# Patient Record
Sex: FEMALE | Race: WHITE | NOT HISPANIC OR LATINO | Employment: FULL TIME | URBAN - METROPOLITAN AREA
[De-identification: names, ages, dates, MRNs, and addresses within clinical notes are randomized per-mention and may not be internally consistent; named-entity substitution may affect disease eponyms.]

---

## 2017-07-07 ENCOUNTER — ALLSCRIPTS OFFICE VISIT (OUTPATIENT)
Dept: OTHER | Facility: OTHER | Age: 27
End: 2017-07-07

## 2017-07-07 ENCOUNTER — GENERIC CONVERSION - ENCOUNTER (OUTPATIENT)
Dept: OTHER | Facility: OTHER | Age: 27
End: 2017-07-07

## 2017-07-11 ENCOUNTER — GENERIC CONVERSION - ENCOUNTER (OUTPATIENT)
Dept: OTHER | Facility: OTHER | Age: 27
End: 2017-07-11

## 2017-07-11 LAB
ADEQUACY: (HISTORICAL): NORMAL
CHLAMYDIA TRACHOMATIS BY MOL. METHOD (HISTORICAL): NEGATIVE
CLINICIAN PROVIDIED ICD 9 OR 10 (HISTORICAL): NORMAL
COMMENT (HISTORICAL): NORMAL
DIAGNOSIS (HISTORICAL): NORMAL
N GONORRHOEAE, AMPLIFIED DNA (HISTORICAL): NEGATIVE
NOTE: (HISTORICAL): NORMAL
PERFORMED BY (HISTORICAL): NORMAL
REFLEX (HISTORICAL): NORMAL
TEST INFORMATION (HISTORICAL): NORMAL

## 2018-01-10 NOTE — PROGRESS NOTES
Assessment    1  Oral contraceptive prescribed (V25 01) (Z30 011)   2  Never a smoker   3  Exposure to potentially hazardous body fluids (V15 85) (Z77 21)   4  Cholelithiasis (574 20) (K80 20)   5  Abdominal pain, RUQ (789 01) (R10 11)    Plan  Abdominal pain, RUQ, Cholelithiasis    · * [place] HEPATOBILIARY; Status:Need Information - Financial Authorization; Requested  for:83Lse7478;   Exposure to potentially hazardous body fluids    · (LC) Chlamydia/GC Amplification; Status:Active; Requested for:94Clb7289;   Oral contraceptive prescribed    · Norgestim-Eth Estrad Triphasic 0 18/0 215/0 25 MG-35 MCG Oral Tablet (Ortho  Tri-Cyclen (28)); take one tablet by mouth every day    Discussion/Summary    Cholelithiasis - reviewed ER results and current diet restriction, hepatobiliary scan  oral contraceptives - working well  Chief Complaint  f/u medication review rmklpn      History of Present Illness  She is working full time and doing well  her birth control is working well  She had a gall bladder attack this summer  She gets pain if she eats dairy or anything fatty  Active Problems    1  Anal fissure (565 0) (K60 2)   2  Anemia (285 9) (D64 9)   3  Cholelithiasis (574 20) (K80 20)   4  Chondromalacia of patella, 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)   5  Encounter for routine pelvic examination (V72 31) (Z01 419)   6  Hidradenitis suppurativa (705 83) (L73 2)   7  Long term use of drug (V58 69) (Z79 899)   8  Oral contraceptive prescribed (V25 01) (Z30 011)   9  Right knee pain (719 46) (M25 561)   10  Well adult on routine health check (V70 0) (Z00 00)    Past Medical History    1  History of Acute upper respiratory infection (465 9) (J06 9)   2  History of Acute upper respiratory infection (465 9) (J06 9)   3  History of Hip pain, unspecified laterality   4  History of abdominal pain (V13 89) (Z87 898)   5  History of acute conjunctivitis (V12 49) (Z86 69)   6   History of infectious mononucleosis (V12 09) (Z86 19)   7  History of low back pain (V13 59) (Z87 39)   8  History of Late Effects Of Sprain Or Strain (905 7)   9  Oral contraceptive prescribed (V25 01) (Z30 011)   10  History of Otitis media, unspecified laterality    Social History    · Never a smoker  The social history was reviewed and updated today  Current Meds   1  Norgestim-Eth Estrad Triphasic 0 18/0 215/0 25 MG-35 MCG Oral Tablet; take one tablet   by mouth every day; Therapy: 02XIF7779 to (Last Rx:20Jan2016)  Requested for: 20Jan2016 Ordered    Allergies    1  No Known Drug Allergies    Vitals  Vital Signs [Data Includes: Current Encounter]    Recorded: 25Rgd9148 06:48PM   Temperature 98 2 F   Heart Rate 76   Respiration 18   Systolic 863   Diastolic 60   Height 5 ft 5 in   Weight 127 lb    BMI Calculated 21 13   BSA Calculated 1 63     Physical Exam    Constitutional   General appearance: No acute distress, well appearing and well nourished  Ears, Nose, Mouth, and Throat   External inspection of ears and nose: Normal     Otoscopic examination: Tympanic membranes translucent with normal light reflex  Canals patent without erythema  Oropharynx: Normal with no erythema, edema, exudate or lesions  Pulmonary   Auscultation of lungs: Clear to auscultation  Cardiovascular   Auscultation of heart: Normal rate and rhythm, normal S1 and S2, without murmurs  Musculoskeletal   Gait and station: Normal          Health Management  Encounter for routine pelvic examination   (every) THINPREP PAP RFX HR HPV DNA AND C  TRACHOMATIS AND N  GONORRHOEAE; every 1  year; Last 47HTP2699; Next Due: 82RVC6113;  Overdue    Signatures   Electronically signed by : Anila Beal DO; Feb  3 2016  7:14PM EST                       (Author)

## 2018-01-10 NOTE — PROGRESS NOTES
Assessment    1  Encounter for preventive health examination (V70 0) (Z00 00)   2  Encounter for routine pelvic examination (V72 31) (Z01 419)   3  Never a smoker   4  Skin lesion (709 9) (L98 9)    Plan  Encounter for routine pelvic examination    · (1) CHLAMYDIA/GC AMPLIFIED DNA, PCR; Source:Endocervical; Status: In Progress -  Specimen/Data Collected;   Done: 57SPF4956   · (LC) Pap IG, rfx HPV ASCU; Status: In Progress - Specimen/Data Collected;   Done:  35JEE3810  Oral contraceptive prescribed    · Norgestim-Eth Estrad Triphasic 0 18/0 215/0 25 MG-35 MCG Oral Tablet (Ortho  Tri-Cyclen (28)); take one tablet by mouth every day  Skin lesion    · 2 - Lenin GIVENS, Fide Becerra  (Dermatology) Physician Referral  Consult  Status: Hold For -  Scheduling  Requested for: 82HVP1361  Care Summary provided  : Yes    Discussion/Summary  health maintenance visit healthy adult female Currently, she eats a healthy diet and has an adequate exercise regimen  Pap test with reflex HPV testing was done today Testing was done today for chlamydia and gonorrhea  Breast cancer screening: breast cancer screening is not indicated  Chief Complaint  CPE rmklpn      History of Present Illness  HM, Adult Female: The patient is being seen for a health maintenance and gynecology evaluation  General Health: The patient's health since the last visit is described as good  Immunizations status: up to date  Lifestyle:  She consumes a diverse and healthy diet  She does not have any weight concerns  She exercises regularly  She does not use tobacco    Reproductive health: the patient is premenopausal    Screening:      Review of Systems    Constitutional: No fever, no chills, feels well, no tiredness, no recent weight gain or weight loss  ENT: no complaints of earache, no loss of hearing, no nose bleeds, no nasal discharge, no sore throat, no hoarseness     Cardiovascular: No complaints of slow heart rate, no fast heart rate, no chest pain, no palpitations, no leg claudication, no lower extremity edema  Respiratory: No complaints of shortness of breath, no wheezing, no cough, no SOB on exertion, no orthopnea, no PND  Neurological: No complaints of headache, no confusion, no convulsions, no numbness, no dizziness or fainting, no tingling, no limb weakness, no difficulty walking  Endocrine: No complaints of proptosis, no hot flashes, no muscle weakness, no deepening of the voice, no feelings of weakness  Active Problems    1  Anemia (285 9) (D64 9)   2  Cholelithiasis (574 20) (K80 20)   3  Chondromalacia of patella, 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)   4  Encounter for routine pelvic examination (V72 31) (Z01 419)   5  Exposure to potentially hazardous body fluids (V15 85) (Z77 21)   6  Hidradenitis suppurativa (705 83) (L73 2)   7  Long term use of drug (V58 69) (Z79 899)   8  Oral contraceptive prescribed (V25 01) (Z30 011)   9  Well adult on routine health check (V70 0) (Z00 00)    Past Medical History    · Abdominal pain, RUQ (789 01) (R10 11)   · History of Acute upper respiratory infection (465 9) (J06 9)   · History of Acute upper respiratory infection (465 9) (J06 9)   · History of Anal fissure (565 0) (K60 2)   · History of Hip pain, unspecified laterality   · History of abdominal pain (V13 89) (O60 442)   · History of acute conjunctivitis (V12 49) (Z86 69)   · History of infectious mononucleosis (V12 09) (Z86 19)   · History of low back pain (V13 59) (Z87 39)   · History of Late Effects Of Sprain Or Strain (905 7)   · Oral contraceptive prescribed (V25 01) (Z30 011)   · History of Otitis media, unspecified laterality    Social History    · Never a smoker    Current Meds   1  Norgestim-Eth Estrad Triphasic 0 18/0 215/0 25 MG-35 MCG Oral Tablet; take one tablet   by mouth every day; Therapy: 28JFI4432 to (Evaluate:24Gvm4043)  Requested for: 19Apr2016; Last   Rx:52Clh8540 Ordered    Allergies    1   No Known Drug Allergies    Vitals   Recorded: 56ODB4083 04:35PM   Temperature 98 2 F   Heart Rate 82   Respiration 18   Systolic 106   Diastolic 66   Height 5 ft 5 in   Weight 124 lb    BMI Calculated 20 63   BSA Calculated 1 62     Physical Exam    Constitutional   General appearance: No acute distress, well appearing and well nourished  Ears, Nose, Mouth, and Throat   External inspection of ears and nose: Normal     Otoscopic examination: Tympanic membranes translucent with normal light reflex  Canals patent without erythema  Oropharynx: Normal with no erythema, edema, exudate or lesions  Pulmonary   Auscultation of lungs: Clear to auscultation  Cardiovascular   Auscultation of heart: Normal rate and rhythm, normal S1 and S2, no murmurs  Chest   Breasts: Normal, no dimpling or skin changes appreciated  Palpation of breasts and axillae: Normal, no masses palpated  Abdomen   Abdomen: Non-tender, no masses  Liver and spleen: No hepatomegaly or splenomegaly  Genitourinary   External genitalia and vagina: Normal, no lesions appreciated  Urethra: Normal, no discharge  Bladder: Not distended, no tenderness  Cervix: Normal, no lesions, Pap obtained  Uterus: Normal size, no tenderness, no masses  Adnexa/Parametria: Normal, no masses or tenderness  Lymphatic   Palpation of lymph nodes in neck: No lymphadenopathy  Musculoskeletal   Gait and station: Normal     Neurologic   Cortical function: Normal mental status  Psychiatric   Mood and affect: Normal        Procedure    Procedure: Visual Acuity Test    Indication: routine screening  Inforrmation supplied by a Snellen chart  Results: 20/30 in both eyes with corrective device, 20/30 in the right eye with corrective device, 20/50 in the left eye with corrective device      Health Management  Encounter for routine pelvic examination   (every) THINPREP PAP RFX HR HPV DNA AND C  TRACHOMATIS AND N   GONORRHOEAE; every 2 years;  Last 47QKN9727; Next Due: 01SZI7228;  Active    Signatures   Electronically signed by : Maribell Griffin DO; May 20 2016  5:06PM EST                       (Author)

## 2018-01-12 NOTE — RESULT NOTES
Verified Results  * NM HEPATOBILIARY [id number] Amrit Arauz Order Number: [id number]   Performing Comments: cholelithiasis on ultrasound, no CCK   - Patient Instructions: To schedule this appointment, please contact Central Scheduling at [phone]  Test Name Result Flag Reference   NM HEPATOBILIARY (Report)     HEPATOBILIARY SCAN     INDICATION: Right upper quadrant pain     COMPARISON: None available     TECHNIQUE:  Following the intravenous administration of 5 2 mCi Tc-99m labeled Choletec, dynamic abdominal imaging was obtained in the anterior projection over a 60 minute time period  FINDINGS:      There is prompt, uniform accumulation with normal clearance of the radiopharmaceutical by the liver  There is normal filling of the intrahepatic ducts, common bile duct and gallbladder with normal excretion of the radiopharmaceutical into the duodenum  IMPRESSION:     Normal hepatobiliary study         Workstation performed: [id number]     Signed by:   Tommy Fernandez MD   3/25/16       Discussion/Summary   Howard Leggett,   Your gall bladder scan is normal    Dr Alissa Rausch

## 2018-01-12 NOTE — RESULT NOTES
Discussion/Summary   Yumiko Alvarez,   Your chlamydia and gonorrhea tests are negative     Dr Mitch Tuttle      Verified Results  [facility]) [facility] Amplification [id number] 12:00AM Blanca Clark     Test Name Result Flag Reference   Chlamydia trachomatis, ZOEY Negative  Negative   Neisseria gonorrhoeae, ZOEY Negative  Negative

## 2018-01-15 NOTE — RESULT NOTES
Discussion/Summary   Geovanni Neely,   Your pap smear is normal    Dr Makenzie Frank      Verified Results  [facility]) Pap IG, rfx HPV ASCU [id number] 12:00AM Bhanu, [address]    Agnieszka Lui Endocervix  No  of containers  Agnieszka Liu 01 CYTYC Thin Prep Vial     Test Name Result Flag Reference   DIAGNOSIS: Comment     NEGATIVE FOR INTRAEPITHELIAL LESION AND MALIGNANCY  Specimen adequacy: Comment     Satisfactory for evaluation  Endocervical and/or squamous metaplastic  cells (endocervical component) are present  Clinician provided ICD10: Comment     Z01 419   Performed by: Aby Bashir Cytotechnologist (ASCP)   Comm   Note: Comment     The Pap smear is a screening test designed to aid in the detection of  premalignant and malignant conditions of the uterine cervix  It is not a  diagnostic procedure and should not be used as the sole means of detecting  cervical cancer  Both false-positive and false-negative reports do occur  Test Methodology: Comment     This liquid based ThinPrep(R) pap test was screened with the  use of an image guided system  Reflex Comment     The HPV DNA reflex criteria were not met with this specimen result  therefore, no HPV testing was performed

## 2018-01-15 NOTE — PROGRESS NOTES
Assessment    1  Encounter for preventive health examination (V70 0) (Z00 00)   2  Encounter for routine pelvic examination (V72 31) (Z01 419)   3  Screening for gonorrhea (V74 5) (Z11 3)   4  Screening for chlamydial disease (V73 98) (Z11 8)    Plan  Encounter for routine pelvic examination    · (LC) Pap IG, rfx HPV ASCU; Status: In Progress - Specimen/Data Collected;   Done:  94VSF7322  Health Maintenance    · Begin a limited exercise program ; Status:Complete;   Done: 88XLS7648 10:22AM  Oral contraceptive prescribed    · Norgestim-Eth Estrad Triphasic 0 18/0 215/0 25 MG-35 MCG Oral Tablet (Ortho  Tri-Cyclen (28)); take one tablet by mouth every day  Screening for chlamydial disease, Screening for gonorrhea    · (1) CHLAMYDIA/GC AMPLIFIED DNA, PCR; Source:Endocervical; Status: In Progress -  Specimen/Data Collected;   Done: 37TWE5160    Discussion/Summary  health maintenance visit healthy adult female Currently, she eats a healthy diet and has an inadequate exercise regimen  Pap test with reflex HPV testing was done today Testing was done today for chlamydia and gonorrhea  Breast cancer screening: breast cancer screening is not indicated  Colorectal cancer screening: colorectal cancer screening is not indicated  The immunizations are up to date  Chief Complaint  cpe      History of Present Illness  , Adult Female: The patient is being seen for a health maintenance and gynecology evaluation  General Health: The patient's health since the last visit is described as good  Lifestyle:  She consumes a diverse and healthy diet  She does not have any weight concerns  She does not exercise regularly  She does not use tobacco    Reproductive health: the patient is premenopausal    Screening:      Review of Systems    Constitutional: No fever, no chills, feels well, no tiredness, no recent weight gain or weight loss     Cardiovascular: No complaints of slow heart rate, no fast heart rate, no chest pain, no palpitations, no leg claudication, no lower extremity edema  Respiratory: No complaints of shortness of breath, no wheezing, no cough, no SOB on exertion, no orthopnea, no PND  Gastrointestinal: No complaints of abdominal pain, no constipation, no nausea or vomiting, no diarrhea, no bloody stools  Active Problems    1  Anemia (285 9) (D64 9)   2  Cholelithiasis (574 20) (K80 20)   3  Chondromalacia of patella, 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)   4  Encounter for routine pelvic examination (V72 31) (Z01 419)   5  Hidradenitis suppurativa (705 83) (L73 2)   6  Long term use of drug (V58 69) (Z79 899)   7  Oral contraceptive prescribed (V25 01) (Z30 011)   8  Well adult on routine health check (V70 0) (Z00 00)    Past Medical History    · Abdominal pain, RUQ (789 01) (R10 11)   · History of Acute upper respiratory infection (465 9) (J06 9)   · History of Acute upper respiratory infection (465 9) (J06 9)   · History of Anal fissure (565 0) (K60 2)   · Exposure to potentially hazardous body fluids (V15 85) (Z77 21)   · History of Hip pain, unspecified laterality   · History of abdominal pain (V13 89) (E03 040)   · History of acute conjunctivitis (V12 49) (Z86 69)   · History of infectious mononucleosis (V12 09) (Z86 19)   · History of low back pain (V13 59) (Z87 39)   · History of Late Effects Of Sprain Or Strain (905 7)   · Oral contraceptive prescribed (V25 01) (Z30 011)   · History of Otitis media, unspecified laterality   · History of Skin lesion (709 9) (L98 9)    Family History  Mother    · Family history of Status post cholecystectomy    Social History    · Never a smoker    Current Meds   1  Norgestim-Eth Estrad Triphasic 0 18/0 215/0 25 MG-35 MCG Oral Tablet; take one tablet   by mouth every day; Therapy: 56WYY9759 to (Evaluate:74Kfr5286)  Requested for: 83GTN6418; Last   Rx:01Jun2017 Ordered    Allergies    1   No Known Drug Allergies    Vitals   Recorded: 04RNO8540 09:57AM   Temperature 97 1 F Heart Rate 84   Respiration 16   Systolic 471   Diastolic 76   Height 5 ft 5 in   Weight 124 lb    BMI Calculated 20 63   BSA Calculated 1 61     Physical Exam    Constitutional   General appearance: No acute distress, well appearing and well nourished  Ears, Nose, Mouth, and Throat   External inspection of ears and nose: Normal     Otoscopic examination: Tympanic membranes translucent with normal light reflex  Canals patent without erythema  Oropharynx: Normal with no erythema, edema, exudate or lesions  Neck   Neck: Supple, symmetric, trachea midline, no masses  Pulmonary   Auscultation of lungs: Clear to auscultation  Cardiovascular   Auscultation of heart: Normal rate and rhythm, normal S1 and S2, no murmurs  Chest   Breasts: Normal, no dimpling or skin changes appreciated  Palpation of breasts and axillae: Normal, no masses palpated  Abdomen   Abdomen: Non-tender, no masses  Liver and spleen: No hepatomegaly or splenomegaly  Genitourinary   External genitalia and vagina: Normal, no lesions appreciated  Urethra: Normal, no discharge  Bladder: Not distended, no tenderness  Cervix: Normal, no lesions, Pap obtained  Uterus: Normal size, no tenderness, no masses  Adnexa/Parametria: Normal, no masses or tenderness  Lymphatic   Palpation of lymph nodes in neck: No lymphadenopathy  Musculoskeletal   Gait and station: Normal     Neurologic   Cortical function: Normal mental status  Psychiatric   Mood and affect: Normal        Procedure    Procedure: Indication: routine screening     Results: 20/20 in both eyes with corrective device, 20/20 in the right eye with corrective device, 20/20 in the left eye with corrective device   Color vision was and the results were normal       Signatures   Electronically signed by : Kaya Trevizo DO; Jul 7 2017 10:22AM EST                       (Author)

## 2018-01-15 NOTE — RESULT NOTES
Verified Results  ([address] [facility]) Pap IG, rfx HPV ASCU [id number] 12:00AM BhanuBc Core   No  of containers  Yaakov Dobbins 01 CYTYC Thin Prep Vial     Test Name Result Flag Reference   DIAGNOSIS: Comment     NEGATIVE FOR INTRAEPITHELIAL LESION AND MALIGNANCY  THE CYTOLOGY PROCESSING WAS PERFORMED AT THE [facility] LOCATED AT  [facility] 12, [address], [address] [place] [id number]  Specimen adequacy: Comment     Satisfactory for evaluation  Endocervical and/or squamous metaplastic  cells (endocervical component) are present  Clinician provided ICD10: Comment     Z01 419   Performed by: Jose G Peralta, Cytotechnologist (ASCP)     Yaakov Dobbins Note: Comment     The Pap smear is a screening test designed to aid in the detection of  premalignant and malignant conditions of the uterine cervix  It is not a  diagnostic procedure and should not be used as the sole means of detecting  cervical cancer  Both false-positive and false-negative reports do occur  Test Methodology: Comment     This liquid based ThinPrep(R) pap test was screened with the  use of an image guided system    Comment     The HPV DNA reflex criteria were not met with this specimen result  therefore, no HPV testing was performed  [facility]) [facility] Amplification [id number] 12:00AM Elbridge Reas     Test Name Result Flag Reference   Chlamydia trachomatis, ZOEY Negative  Negative   Neisseria gonorrhoeae, ZOEY Negative  Negative   Please note: Comment     Acceptable specimens for this test are male urethral swab,  endocervical swab and liquid based pap specimens, vaginal swabs in  APTIMA transports and first void urine  See online Directory of  Services for test number for rectal and pharyngeal specimens  Discussion/Summary   Ru Johnson,   Your pap smear is normal   Gonorrhea and chlamydia are negative     Dr Elkin Preston

## 2018-01-20 ENCOUNTER — HOSPITAL ENCOUNTER (EMERGENCY)
Facility: HOSPITAL | Age: 28
Discharge: HOME | End: 2018-01-20
Attending: EMERGENCY MEDICINE
Payer: COMMERCIAL

## 2018-01-20 ENCOUNTER — APPOINTMENT (EMERGENCY)
Dept: RADIOLOGY | Facility: HOSPITAL | Age: 28
End: 2018-01-20
Payer: COMMERCIAL

## 2018-01-20 VITALS
TEMPERATURE: 98.4 F | OXYGEN SATURATION: 99 % | RESPIRATION RATE: 16 BRPM | BODY MASS INDEX: 20.8 KG/M2 | WEIGHT: 125 LBS | SYSTOLIC BLOOD PRESSURE: 120 MMHG | HEART RATE: 72 BPM | DIASTOLIC BLOOD PRESSURE: 70 MMHG

## 2018-01-20 DIAGNOSIS — K80.20 CALCULUS OF GALLBLADDER WITHOUT CHOLECYSTITIS WITHOUT OBSTRUCTION: Primary | ICD-10-CM

## 2018-01-20 DIAGNOSIS — K80.50 COLIC, BILIARY: ICD-10-CM

## 2018-01-20 LAB
ALBUMIN SERPL BCP-MCNC: 3.7 G/DL (ref 3.5–5)
ALP SERPL-CCNC: 72 U/L (ref 46–116)
ALT SERPL W P-5'-P-CCNC: 32 U/L (ref 12–78)
ANION GAP SERPL CALCULATED.3IONS-SCNC: 5 MMOL/L (ref 4–13)
AST SERPL W P-5'-P-CCNC: 17 U/L (ref 5–45)
BASOPHILS # BLD AUTO: 0 THOUSANDS/ΜL (ref 0–0.1)
BASOPHILS NFR BLD AUTO: 0 % (ref 0–1)
BILIRUB SERPL-MCNC: 0.3 MG/DL (ref 0.2–1)
BUN SERPL-MCNC: 8 MG/DL (ref 5–25)
CALCIUM SERPL-MCNC: 8.8 MG/DL (ref 8.3–10.1)
CHLORIDE SERPL-SCNC: 103 MMOL/L (ref 100–108)
CO2 SERPL-SCNC: 30 MMOL/L (ref 21–32)
CREAT SERPL-MCNC: 0.76 MG/DL (ref 0.6–1.3)
EOSINOPHIL # BLD AUTO: 0.1 THOUSAND/ΜL (ref 0–0.61)
EOSINOPHIL NFR BLD AUTO: 2 % (ref 0–6)
ERYTHROCYTE [DISTWIDTH] IN BLOOD BY AUTOMATED COUNT: 12.6 % (ref 11.6–15.1)
EXT PREG TEST URINE: NEGATIVE
GFR SERPL CREATININE-BSD FRML MDRD: 109 ML/MIN/1.73SQ M
GLUCOSE SERPL-MCNC: 109 MG/DL (ref 65–140)
HCT VFR BLD AUTO: 37.7 % (ref 37–47)
HGB BLD-MCNC: 12.7 G/DL (ref 12–16)
LIPASE SERPL-CCNC: 137 U/L (ref 73–393)
LYMPHOCYTES # BLD AUTO: 2.9 THOUSANDS/ΜL (ref 0.6–4.47)
LYMPHOCYTES NFR BLD AUTO: 44 % (ref 14–44)
MCH RBC QN AUTO: 28.8 PG (ref 27–31)
MCHC RBC AUTO-ENTMCNC: 33.7 G/DL (ref 31.4–37.4)
MCV RBC AUTO: 85 FL (ref 82–98)
MONOCYTES # BLD AUTO: 0.5 THOUSAND/ΜL (ref 0.17–1.22)
MONOCYTES NFR BLD AUTO: 7 % (ref 4–12)
NEUTROPHILS # BLD AUTO: 3 THOUSANDS/ΜL (ref 1.85–7.62)
NEUTS SEG NFR BLD AUTO: 47 % (ref 43–75)
NRBC BLD AUTO-RTO: 0 /100 WBCS
PLATELET # BLD AUTO: 272 THOUSANDS/UL (ref 130–400)
PMV BLD AUTO: 7.8 FL (ref 8.9–12.7)
POTASSIUM SERPL-SCNC: 3.8 MMOL/L (ref 3.5–5.3)
PROT SERPL-MCNC: 7.1 G/DL (ref 6.4–8.2)
RBC # BLD AUTO: 4.41 MILLION/UL (ref 4.2–5.4)
SODIUM SERPL-SCNC: 138 MMOL/L (ref 136–145)
WBC # BLD AUTO: 6.5 THOUSAND/UL (ref 4.8–10.8)

## 2018-01-20 PROCEDURE — 96376 TX/PRO/DX INJ SAME DRUG ADON: CPT

## 2018-01-20 PROCEDURE — 85025 COMPLETE CBC W/AUTO DIFF WBC: CPT | Performed by: EMERGENCY MEDICINE

## 2018-01-20 PROCEDURE — 36415 COLL VENOUS BLD VENIPUNCTURE: CPT | Performed by: EMERGENCY MEDICINE

## 2018-01-20 PROCEDURE — 80053 COMPREHEN METABOLIC PANEL: CPT | Performed by: EMERGENCY MEDICINE

## 2018-01-20 PROCEDURE — 74177 CT ABD & PELVIS W/CONTRAST: CPT

## 2018-01-20 PROCEDURE — 96361 HYDRATE IV INFUSION ADD-ON: CPT

## 2018-01-20 PROCEDURE — 81025 URINE PREGNANCY TEST: CPT | Performed by: EMERGENCY MEDICINE

## 2018-01-20 PROCEDURE — 96375 TX/PRO/DX INJ NEW DRUG ADDON: CPT

## 2018-01-20 PROCEDURE — 99284 EMERGENCY DEPT VISIT MOD MDM: CPT

## 2018-01-20 PROCEDURE — 83690 ASSAY OF LIPASE: CPT | Performed by: EMERGENCY MEDICINE

## 2018-01-20 PROCEDURE — 96374 THER/PROPH/DIAG INJ IV PUSH: CPT

## 2018-01-20 RX ORDER — ONDANSETRON 2 MG/ML
4 INJECTION INTRAMUSCULAR; INTRAVENOUS ONCE
Status: COMPLETED | OUTPATIENT
Start: 2018-01-20 | End: 2018-01-20

## 2018-01-20 RX ORDER — OXYCODONE HYDROCHLORIDE AND ACETAMINOPHEN 5; 325 MG/1; MG/1
1 TABLET ORAL EVERY 6 HOURS PRN
Qty: 20 TABLET | Refills: 0 | Status: SHIPPED | OUTPATIENT
Start: 2018-01-20 | End: 2018-01-30

## 2018-01-20 RX ORDER — ONDANSETRON 4 MG/1
4 TABLET, ORALLY DISINTEGRATING ORAL EVERY 8 HOURS PRN
Qty: 15 TABLET | Refills: 0 | Status: SHIPPED | OUTPATIENT
Start: 2018-01-20 | End: 2018-09-18 | Stop reason: ALTCHOICE

## 2018-01-20 RX ADMIN — SODIUM CHLORIDE 1000 ML: 0.9 INJECTION, SOLUTION INTRAVENOUS at 02:46

## 2018-01-20 RX ADMIN — HYDROMORPHONE HYDROCHLORIDE 1 MG: 1 INJECTION, SOLUTION INTRAMUSCULAR; INTRAVENOUS; SUBCUTANEOUS at 02:47

## 2018-01-20 RX ADMIN — IOHEXOL 100 ML: 350 INJECTION, SOLUTION INTRAVENOUS at 03:54

## 2018-01-20 RX ADMIN — ONDANSETRON 4 MG: 2 INJECTION INTRAMUSCULAR; INTRAVENOUS at 02:47

## 2018-01-20 RX ADMIN — ONDANSETRON 4 MG: 2 INJECTION INTRAMUSCULAR; INTRAVENOUS at 04:53

## 2018-01-20 NOTE — ED PROVIDER NOTES
History  Chief Complaint   Patient presents with    Abdominal Pain     stomach pain started at 11pm, states it feels like a gall bladder attack ( she has had numerous gall bladder attacks in the past)  States they just moved and so they have been eating a lot of take out       History provided by:  Patient  Abdominal Pain   Pain location:  RUQ  Pain quality: aching and cramping    Pain radiates to:  Does not radiate  Pain severity:  Moderate  Onset quality:  Gradual  Timing:  Constant  Progression:  Worsening  Chronicity:  New  Context: diet changes    Relieved by:  Nothing  Worsened by:  Eating  Ineffective treatments:  None tried  Associated symptoms: nausea and vomiting        None       Past Medical History:   Diagnosis Date    Gall bladder disease        History reviewed  No pertinent surgical history  History reviewed  No pertinent family history  I have reviewed and agree with the history as documented  Social History   Substance Use Topics    Smoking status: Never Smoker    Smokeless tobacco: Never Used    Alcohol use Yes      Comment: social        Review of Systems   Gastrointestinal: Positive for abdominal pain, nausea and vomiting  Physical Exam  ED Triage Vitals [01/20/18 0240]   Temperature Pulse Respirations Blood Pressure SpO2   98 4 °F (36 9 °C) 86 16 114/71 99 %      Temp Source Heart Rate Source Patient Position - Orthostatic VS BP Location FiO2 (%)   Tympanic Monitor Lying Right arm --      Pain Score       7           Orthostatic Vital Signs  Vitals:    01/20/18 0240 01/20/18 0441   BP: 114/71 120/70   Pulse: 86 72   Patient Position - Orthostatic VS: Lying Lying       Physical Exam   Constitutional: She is oriented to person, place, and time  She appears well-developed and well-nourished  HENT:   Head: Normocephalic and atraumatic  Eyes: EOM are normal  Pupils are equal, round, and reactive to light  Neck: Normal range of motion  Neck supple     Cardiovascular: Normal rate and regular rhythm  Exam reveals no friction rub  No murmur heard  Pulmonary/Chest: Breath sounds normal  No respiratory distress  She has no wheezes  She has no rales  Abdominal: Soft  Bowel sounds are normal  She exhibits no distension  There is tenderness in the right upper quadrant and epigastric area  Musculoskeletal: Normal range of motion  She exhibits no edema or tenderness  Neurological: She is alert and oriented to person, place, and time  Skin: Skin is warm  No rash noted  Psychiatric: She has a normal mood and affect  Nursing note and vitals reviewed        ED Medications  Medications   sodium chloride 0 9 % bolus 1,000 mL (0 mL Intravenous Stopped 1/20/18 0340)   ondansetron (ZOFRAN) injection 4 mg (4 mg Intravenous Given 1/20/18 0247)   HYDROmorphone (DILAUDID) injection 1 mg (1 mg Intravenous Given 1/20/18 0247)   iohexol (OMNIPAQUE) 350 MG/ML injection (MULTI-DOSE) 100 mL (100 mL Intravenous Given 1/20/18 0354)   ondansetron (ZOFRAN) injection 4 mg (4 mg Intravenous Given 1/20/18 0453)       Diagnostic Studies  Results Reviewed     Procedure Component Value Units Date/Time    POCT pregnancy, urine [[id number]]  (Normal) Resulted:  01/20/18 0320    Lab Status:  Final result Updated:  01/20/18 0320     EXT PREG TEST UR (Ref: Negative) negative    Comprehensive metabolic panel [[id number]] Collected:  01/20/18 0245    Lab Status:  Final result Specimen:  Blood from Arm, Right Updated:  01/20/18 0308     Sodium 138 mmol/L      Potassium 3 8 mmol/L      Chloride 103 mmol/L      CO2 30 mmol/L      Anion Gap 5 mmol/L      BUN 8 mg/dL      Creatinine 0 76 mg/dL      Glucose 109 mg/dL      Calcium 8 8 mg/dL      AST 17 U/L      ALT 32 U/L      Alkaline Phosphatase 72 U/L      Total Protein 7 1 g/dL      Albumin 3 7 g/dL      Total Bilirubin 0 30 mg/dL      eGFR 109 ml/min/1 73sq m     Narrative:         National Kidney Disease Education Program recommendations are as follows:  GFR calculation is accurate only with a steady state creatinine  Chronic Kidney disease less than 60 ml/min/1 73 sq  meters  Kidney failure less than 15 ml/min/1 73 sq  meters  Lipase [[id number]]  (Normal) Collected:  01/20/18 0245    Lab Status:  Final result Specimen:  Blood from Arm, Right Updated:  01/20/18 0308     Lipase 137 u/L     CBC and differential [[id number]]  (Abnormal) Collected:  01/20/18 0245    Lab Status:  Final result Specimen:  Blood from Arm, Right Updated:  01/20/18 0251     WBC 6 50 Thousand/uL      RBC 4 41 Million/uL      Hemoglobin 12 7 g/dL      Hematocrit 37 7 %      MCV 85 fL      MCH 28 8 pg      MCHC 33 7 g/dL      RDW 12 6 %      MPV 7 8 (L) fL      Platelets 913 Thousands/uL      nRBC 0 /100 WBCs      Neutrophils Relative 47 %      Lymphocytes Relative 44 %      Monocytes Relative 7 %      Eosinophils Relative 2 %      Basophils Relative 0 %      Neutrophils Absolute 3 00 Thousands/µL      Lymphocytes Absolute 2 90 Thousands/µL      Monocytes Absolute 0 50 Thousand/µL      Eosinophils Absolute 0 10 Thousand/µL      Basophils Absolute 0 00 Thousands/µL                  CT abdomen pelvis with contrast    (Results Pending)              Procedures  Procedures       Phone Contacts  ED Phone Contact    ED Course  ED Course                                MDM  Number of Diagnoses or Management Options  Calculus of gallbladder without cholecystitis without obstruction: new and requires workup  Colic, biliary: new and requires workup  Diagnosis management comments: 5:20 AM  Offered admission at this point time patient wants to go home does not want stay  Laboratory studies are unremarkable at this point time  Patient has cholelithiasis and biliary colic  Possible early of cholecystitis is there however the patient does not want to stay for further management evaluation as well as does not want to be in the inpatient side  Her pain is somewhat controlled here with medication    I plan on sending her as an outpatient at this point time with follow-up with PCP  Given strict instructions when to return back to the emergency department pain medication as well as Zofran I gave her instructions to return back to the emergency department if pain worsens development of fever worsening nausea or vomiting  Pt re-examined and evaluated after testing and treatment  Spoke with the patient and feeling improved and sxs have resolved  Will discharge home with close f/u with pcp and instructed to return to the ED if sxs worsen or continue  Pt agrees with the plan for discharge and feels comfortable to go home with proper f/u  Advised to return for worsening or additional problems  Diagnostic tests were reviewed and questions answered  Diagnosis, care plan and treatment options were discussed  The patient understand instructions and will follow up as directed  Amount and/or Complexity of Data Reviewed  Clinical lab tests: reviewed and ordered  Tests in the radiology section of CPT®: ordered and reviewed  Review and summarize past medical records: yes      CritCare Time    Disposition  Final diagnoses:   Calculus of gallbladder without cholecystitis without obstruction   Colic, biliary     Time reflects when diagnosis was documented in both MDM as applicable and the Disposition within this note     Time User Action Codes Description Comment    1/20/2018  5:20 AM Marcy Numbers Add [K80 20] Calculus of gallbladder without cholecystitis without obstruction     1/20/2018  5:20 AM Marcy Numbers Add [V58 97] Colic, biliary       ED Disposition     ED Disposition Condition Comment    Discharge  [facility] discharge to home/self care      Condition at discharge: Stable        Follow-up Information     Follow up With Specialties Details Why [address] Gaby Coffman MD General Surgery Call in 2 days If symptoms worsen [address], [facility] [address]  [phone]          Discharge Medication List as of 1/20/2018  5:21 AM      START taking these medications    Details   ondansetron (ZOFRAN ODT) 4 mg disintegrating tablet Take 1 tablet by mouth every 8 (eight) hours as needed for nausea or vomiting, Starting Sat 1/20/2018, Print      oxyCODONE-acetaminophen (PERCOCET) 5-325 mg per tablet Take 1 tablet by mouth every 6 (six) hours as needed for moderate pain (May use up to two tablets every 6 hours ) for up to 10 days Max Daily Amount: 4 tablets, Starting Sat 1/20/2018, Until Tue 1/30/2018, Print           No discharge procedures on file      ED Provider  Electronically Signed by           Erik Mak DO  01/20/18 4493

## 2018-01-20 NOTE — ED NOTES
Client vomited  MD aware  Client medicated for pain and vomiting     Timothy Welch RN  01/20/18 3065

## 2018-01-20 NOTE — DISCHARGE INSTRUCTIONS
Biliary Colic   WHAT YOU NEED TO KNOW:   Biliary colic is severe pain in your upper abdomen caused by a gallbladder problem  Your gallbladder stores bile, which helps break down the fats that you eat  DISCHARGE INSTRUCTIONS:   Medicines:   · Medicines  can help decrease pain and muscle spasms  You may also need medicine to calm your stomach and stop vomiting  · Take your medicine as directed  Contact your healthcare provider if you think your medicine is not helping or you have side effects  Tell him if you are allergic to any medicine  Keep a list of the medicines, vitamins, and herbs you take  Include the amounts, and when and why you take them  Bring the list or the pill bottles to follow-up visits  Carry your medicine list with you in case of an emergency  Avoid alcohol:  Alcohol can damage your gallbladder and make your symptoms worse  Maintain a healthy weight:  Ask your healthcare provider how much you should weigh  Ask him to help you create a weight loss plan if you are overweight  Eat a variety of healthy foods:  Healthy foods include fruits, vegetables, whole-grain breads, low-fat dairy products, beans, lean meats, and fish  Foods that are high in fiber and low in fat and cholesterol may decrease your symptoms  Ask if you need to be on a special diet  Exercise:  Ask your healthcare provider about the best exercise plan for you  Exercise may help improve your symptoms  Follow up with your healthcare provider as directed:  Bring a list of any questions you have so you remember to ask them during your visits  Contact your healthcare provider if:   · You have a fever  · Your pain gets worse, even after you take medicine  · You have nausea or are vomiting  · Your skin or eyes are yellow  · You have questions or concerns about your condition or care  Return to the emergency department if:   · You have severe pain  · You feel like you are going to faint      · You are short of breath  © 2017 [facility] Information is for End User's use only and may not be sold, redistributed or otherwise used for commercial purposes  All illustrations and images included in CareNotes® are the copyrighted property of A D A M , Inc  or [address]  The above information is an  only  It is not intended as medical advice for individual conditions or treatments  Talk to your doctor, nurse or pharmacist before following any medical regimen to see if it is safe and effective for you  Gallstones   WHAT YOU NEED TO KNOW:   Gallstones, also called cholelithiasis, are hard substances that form in your gallbladder or bile duct  Your gallbladder and bile duct are located on the right side of your abdomen, near your liver  Your gallbladder stores bile, which helps break down the fat that you eat  Your gallbladder also helps remove certain chemicals from your body  DISCHARGE INSTRUCTIONS:   Medicines:   · Prescription pain medicine  may be given  Ask your healthcare provider how to take this medicine safely  · Take your medicine as directed  Contact your healthcare provider if you think your medicine is not helping or if you have side effects  Tell him if you are allergic to any medicine  Keep a list of the medicines, vitamins, and herbs you take  Include the amounts, and when and why you take them  Bring the list or the pill bottles to follow-up visits  Carry your medicine list with you in case of an emergency  Follow up with your healthcare provider as directed:  Write down your questions so you remember to ask them during your visits  Eat a variety of healthy foods: This may help you have more energy and heal faster  Healthy foods include fruit, vegetables, whole-grain breads, low-fat dairy products, beans, lean meat, and fish  Ask if you need to be on a special diet    Exercise as directed:  Talk to your healthcare provider about the best exercise plan for you  Exercise can help you lose weight and improve your health  Contact your healthcare provider if:   · You have nausea and are vomiting  · Your urine is dark  · You have juan miguel-colored bowel movements  · You have questions or concerns about your condition or care  Seek care immediately or call 911 if:   · You have a fever and chills  · Your skin or eyes turn yellow  · You have severe pain in your upper abdomen, just below the right ribcage  © 2017 [facility] Information is for End User's use only and may not be sold, redistributed or otherwise used for commercial purposes  All illustrations and images included in CareNotes® are the copyrighted property of A D A M , Inc  or Koby Ibarra  The above information is an  only  It is not intended as medical advice for individual conditions or treatments  Talk to your doctor, nurse or pharmacist before following any medical regimen to see if it is safe and effective for you

## 2018-01-22 VITALS
RESPIRATION RATE: 16 BRPM | DIASTOLIC BLOOD PRESSURE: 76 MMHG | SYSTOLIC BLOOD PRESSURE: 118 MMHG | WEIGHT: 124 LBS | HEART RATE: 84 BPM | HEIGHT: 65 IN | BODY MASS INDEX: 20.66 KG/M2 | TEMPERATURE: 97.1 F

## 2018-02-09 DIAGNOSIS — Z30.011 ORAL CONTRACEPTIVE PRESCRIBED: Primary | ICD-10-CM

## 2018-02-09 RX ORDER — NORGESTIMATE AND ETHINYL ESTRADIOL 7DAYSX3 28
1 KIT ORAL DAILY
Qty: 28 TABLET | Refills: 5 | Status: SHIPPED | OUTPATIENT
Start: 2018-02-09 | End: 2018-03-26 | Stop reason: SDUPTHER

## 2018-02-09 RX ORDER — NORGESTIMATE AND ETHINYL ESTRADIOL 7DAYSX3 28
1 KIT ORAL DAILY
COMMUNITY
Start: 2012-07-06 | End: 2018-02-09 | Stop reason: SDUPTHER

## 2018-03-26 DIAGNOSIS — Z30.011 ORAL CONTRACEPTIVE PRESCRIBED: ICD-10-CM

## 2018-03-26 RX ORDER — NORGESTIMATE AND ETHINYL ESTRADIOL 7DAYSX3 28
1 KIT ORAL DAILY
Qty: 28 TABLET | Refills: 4 | Status: SHIPPED | OUTPATIENT
Start: 2018-03-26 | End: 2018-04-25 | Stop reason: SDUPTHER

## 2018-04-25 DIAGNOSIS — Z30.011 ORAL CONTRACEPTIVE PRESCRIBED: ICD-10-CM

## 2018-04-25 RX ORDER — NORGESTIMATE AND ETHINYL ESTRADIOL 7DAYSX3 28
1 KIT ORAL DAILY
Qty: 28 TABLET | Refills: 4 | Status: SHIPPED | OUTPATIENT
Start: 2018-04-25 | End: 2018-05-29 | Stop reason: SDUPTHER

## 2018-04-25 NOTE — TELEPHONE ENCOUNTER
Pt left message on refill hotline for a refill on [facility]   [place], [place]

## 2018-05-29 DIAGNOSIS — Z30.011 ORAL CONTRACEPTIVE PRESCRIBED: ICD-10-CM

## 2018-05-29 RX ORDER — NORGESTIMATE AND ETHINYL ESTRADIOL 7DAYSX3 28
1 KIT ORAL DAILY
Qty: 28 TABLET | Refills: 2 | Status: SHIPPED | OUTPATIENT
Start: 2018-05-29 | End: 2018-08-21 | Stop reason: SDUPTHER

## 2018-08-21 DIAGNOSIS — Z30.011 ORAL CONTRACEPTIVE PRESCRIBED: ICD-10-CM

## 2018-08-21 RX ORDER — NORGESTIMATE AND ETHINYL ESTRADIOL 7DAYSX3 28
1 KIT ORAL DAILY
Qty: 28 TABLET | Refills: 1 | Status: SHIPPED | OUTPATIENT
Start: 2018-08-21 | End: 2018-08-24 | Stop reason: SDUPTHER

## 2018-08-21 NOTE — TELEPHONE ENCOUNTER
Pt left message on refill hotline for a refill on her birth control [facility]  She is out of refills  [facility]   Tyree Herndon, [address]

## 2018-08-22 NOTE — TELEPHONE ENCOUNTER
I refilled her birth control, but she is due for a physical   Please ask her to schedule    Kj Moss, DO

## 2018-08-24 DIAGNOSIS — Z30.011 ORAL CONTRACEPTIVE PRESCRIBED: ICD-10-CM

## 2018-08-24 RX ORDER — NORGESTIMATE AND ETHINYL ESTRADIOL 7DAYSX3 28
1 KIT ORAL DAILY
Qty: 28 TABLET | Refills: 0 | Status: SHIPPED | OUTPATIENT
Start: 2018-08-24 | End: 2018-09-18 | Stop reason: SDUPTHER

## 2018-09-18 ENCOUNTER — OFFICE VISIT (OUTPATIENT)
Dept: FAMILY MEDICINE CLINIC | Facility: CLINIC | Age: 28
End: 2018-09-18
Payer: COMMERCIAL

## 2018-09-18 VITALS
TEMPERATURE: 97.8 F | DIASTOLIC BLOOD PRESSURE: 80 MMHG | HEIGHT: 66 IN | BODY MASS INDEX: 21.15 KG/M2 | RESPIRATION RATE: 16 BRPM | WEIGHT: 131.6 LBS | SYSTOLIC BLOOD PRESSURE: 108 MMHG | HEART RATE: 92 BPM

## 2018-09-18 DIAGNOSIS — Z30.011 ORAL CONTRACEPTIVE PRESCRIBED: ICD-10-CM

## 2018-09-18 DIAGNOSIS — J06.9 ACUTE UPPER RESPIRATORY INFECTION: Primary | ICD-10-CM

## 2018-09-18 PROCEDURE — 99213 OFFICE O/P EST LOW 20 MIN: CPT | Performed by: NURSE PRACTITIONER

## 2018-09-18 PROCEDURE — 3725F SCREEN DEPRESSION PERFORMED: CPT | Performed by: NURSE PRACTITIONER

## 2018-09-18 PROCEDURE — 3008F BODY MASS INDEX DOCD: CPT | Performed by: NURSE PRACTITIONER

## 2018-09-18 RX ORDER — DEXTROMETHORPHAN HYDROBROMIDE AND PROMETHAZINE HYDROCHLORIDE 15; 6.25 MG/5ML; MG/5ML
5 SYRUP ORAL 4 TIMES DAILY PRN
Qty: 240 ML | Refills: 0 | Status: SHIPPED | OUTPATIENT
Start: 2018-09-18 | End: 2018-09-25

## 2018-09-18 RX ORDER — AZITHROMYCIN 250 MG/1
TABLET, FILM COATED ORAL
Qty: 6 TABLET | Refills: 0 | Status: SHIPPED | OUTPATIENT
Start: 2018-09-18 | End: 2018-09-23

## 2018-09-18 RX ORDER — NORGESTIMATE AND ETHINYL ESTRADIOL 7DAYSX3 28
1 KIT ORAL DAILY
Qty: 28 TABLET | Refills: 0 | Status: SHIPPED | OUTPATIENT
Start: 2018-09-18 | End: 2018-09-27 | Stop reason: SDUPTHER

## 2018-09-18 NOTE — PROGRESS NOTES
Assessment/Plan:    Advised on supportive care  Follow up as needed  Problem List Items Addressed This Visit     None      Visit Diagnoses     Acute upper respiratory infection    -  Primary    Relevant Medications    azithromycin (ZITHROMAX) 250 mg tablet    promethazine-dextromethorphan (PHENERGAN-DM) 6 25-15 mg/5 mL oral syrup          Patient Instructions   Sudafed as needed for sinus congestion and pressure  Return if symptoms worsen or fail to improve  Subjective:      Patient ID: Miguelina Oconnell is a 24 y o  female  Chief Complaint   Patient presents with    Cough     for 6 days prcma    Headache       5 days ago she had body aches, felt feverish, and had a sore throat  Now she has a terrible cough and post nasal drip  Cough is productive and wakes her up in the middle of the night  Denies any SOB or wheezing  Took Robitussin last night which did not help  The following portions of the patient's history were reviewed and updated as appropriate: allergies, current medications, past family history, past medical history, past social history, past surgical history and problem list     Review of Systems   Constitutional: Positive for fatigue  Negative for chills and fever  HENT: Positive for congestion and postnasal drip  Negative for ear pain, rhinorrhea, sinus pressure and sore throat  Respiratory: Positive for cough  Negative for shortness of breath and wheezing  Cardiovascular: Negative for chest pain  Gastrointestinal: Negative for abdominal pain, diarrhea, nausea and vomiting  Musculoskeletal: Negative for arthralgias  Skin: Negative for rash  Neurological: Negative for headaches           Current Outpatient Prescriptions   Medication Sig Dispense Refill    norgestimate-ethinyl estradiol (ORTHO TRI-CYCLEN,TRINESSA) 0 18/0 215/0 25 MG-35 MCG per tablet Take 1 tablet by mouth daily 28 tablet 0    azithromycin (ZITHROMAX) 250 mg tablet 2 tabs PO day 1, then 1 tab PO days 2-5 6 tablet 0    promethazine-dextromethorphan (PHENERGAN-DM) 6 25-15 mg/5 mL oral syrup Take 5 mL by mouth 4 (four) times a day as needed for cough for up to 7 days 240 mL 0     No current facility-administered medications for this visit  Objective:    /80   Pulse 92   Temp 97 8 °F (36 6 °C)   Resp 16   Ht 5' 6" (1 676 m)   Wt 59 7 kg (131 lb 9 6 oz)   BMI 21 24 kg/m²        Physical Exam   Constitutional: She appears well-developed and well-nourished  HENT:   Right Ear: Tympanic membrane, external ear and ear canal normal    Left Ear: Tympanic membrane, external ear and ear canal normal    Nose: No mucosal edema  Mouth/Throat: Oropharynx is clear and moist and mucous membranes are normal    Eyes: Conjunctivae are normal    Cardiovascular: Normal rate, regular rhythm and normal heart sounds  Pulmonary/Chest: Effort normal and breath sounds normal    Abdominal: Bowel sounds are normal  She exhibits no distension  There is no splenomegaly or hepatomegaly  There is no tenderness  Lymphadenopathy:        Right cervical: No superficial cervical adenopathy present  Left cervical: No superficial cervical adenopathy present  Skin: No rash noted  Psychiatric: She has a normal mood and affect  Nursing note and vitals reviewed               Tiago Packer

## 2018-09-18 NOTE — LETTER
September 18, 2018     Patient: Denita Quiles   YOB: 1990   Date of Visit: 9/18/2018       To Whom it May Concern:    Gerry Roldan is under my professional care  She was seen in my office on 9/18/2018  If you have any questions or concerns, please don't hesitate to call           Sincerely,          PATRICIA Dunbar        CC: No Recipients

## 2018-09-27 ENCOUNTER — OFFICE VISIT (OUTPATIENT)
Dept: FAMILY MEDICINE CLINIC | Facility: CLINIC | Age: 28
End: 2018-09-27
Payer: COMMERCIAL

## 2018-09-27 VITALS
SYSTOLIC BLOOD PRESSURE: 102 MMHG | WEIGHT: 131.6 LBS | HEIGHT: 66 IN | HEART RATE: 60 BPM | BODY MASS INDEX: 21.15 KG/M2 | RESPIRATION RATE: 16 BRPM | TEMPERATURE: 97.6 F | DIASTOLIC BLOOD PRESSURE: 72 MMHG

## 2018-09-27 DIAGNOSIS — Z01.419 ENCOUNTER FOR GYNECOLOGICAL EXAMINATION WITHOUT ABNORMAL FINDING: ICD-10-CM

## 2018-09-27 DIAGNOSIS — Z00.00 ANNUAL PHYSICAL EXAM: Primary | ICD-10-CM

## 2018-09-27 DIAGNOSIS — Z30.011 ORAL CONTRACEPTIVE PRESCRIBED: ICD-10-CM

## 2018-09-27 PROCEDURE — 99395 PREV VISIT EST AGE 18-39: CPT | Performed by: FAMILY MEDICINE

## 2018-09-27 RX ORDER — NORGESTIMATE AND ETHINYL ESTRADIOL 7DAYSX3 28
1 KIT ORAL DAILY
Qty: 84 TABLET | Refills: 3 | Status: SHIPPED | OUTPATIENT
Start: 2018-09-27 | End: 2018-10-16 | Stop reason: SDUPTHER

## 2018-09-27 NOTE — PROGRESS NOTES
FAMILY PRACTICE HEALTH MAINTENANCE OFFICE VISIT  [facility]    NAME: Rae Pena  AGE: 24 y o  SEX: female  : 1990     DATE: 2018    Assessment and Plan     Problem List Items Addressed This Visit     Oral contraceptive prescribed    Relevant Medications    norgestimate-ethinyl estradiol (ORTHO TRI-CYCLEN,TRINESSA) 0 18/0 215/0 25 MG-35 MCG per tablet      Other Visit Diagnoses     Annual physical exam    -  Primary    Encounter for gynecological examination without abnormal finding        Relevant Orders    Pap IG, Rfx HPV ASCU            · Patient Counseling:   · Nutrition: Stressed importance of a well balanced diet, moderation of sodium/saturated fat, caloric balance and sufficient intake of fiber  · Exercise: Stressed the importance of regular exercise with a goal of 150 minutes per week  · Dental Health: Discussed daily flossing and brushing and regular dental visits     · Immunizations reviewed Adacel due in January  Flu shot declined  · Discussed benefits of screening pap smears  · Discussed the patient's BMI with her    The BMI is in the acceptable range     Return in about 1 year (around 2019) for Annual physical         Chief Complaint     Chief Complaint   Patient presents with    Physical Exam     With pap  prcma       History of Present Illness     HPI    Well Adult Physical   Patient here for a comprehensive physical exam       Diet and Physical Activity  Diet: well balanced diet  Weight concerns: None, patient's BMI is between 18 5-24 9  Exercise: walking daily      Depression Screen  PHQ-9 Depression Screening    PHQ-9:    Frequency of the following problems over the past two weeks:       Little interest or pleasure in doing things:  0 - not at all  Feeling down, depressed, or hopeless:  0 - not at all  PHQ-2 Score:  0          General Health  Hearing: Normal:  bilateral  Vision: wears glasses  Dental: no dental visits for >1 year    Reproductive Health  Doing well on her birth control pills  The following portions of the patient's history were reviewed and updated as appropriate: allergies, current medications, past family history, past medical history, past social history, past surgical history and problem list     Review of Systems     Review of Systems   Constitutional: Negative  Respiratory: Negative  Cardiovascular: Negative  Past Medical History     Past Medical History:   Diagnosis Date    Anal fissure     Last assessed 12/19/2013   Tyrese Bird bladder disease     Skin lesion     Last assessed 5/20/2016       Past Surgical History     Past Surgical History:   Procedure Laterality Date    CHOLECYSTECTOMY         Social History     Social History     Social History    Marital status: Single     Spouse name: N/A    Number of children: N/A    Years of education: N/A     Social History Main Topics    Smoking status: Never Smoker    Smokeless tobacco: Never Used    Alcohol use Yes      Comment: social    Drug use: No    Sexual activity: Not Asked     Other Topics Concern    None     Social History Narrative    None       Family History     Family History   Problem Relation Age of Onset    Other Mother     No Known Problems Father     Cholecystitis Sister     Mental illness Neg Hx        Current Medications       Current Outpatient Prescriptions:     norgestimate-ethinyl estradiol (ORTHO TRI-CYCLEN,TRINESSA) 0 18/0 215/0 25 MG-35 MCG per tablet, Take 1 tablet by mouth daily, Disp: 84 tablet, Rfl: 3     Allergies     No Known Allergies    Objective     /72   Pulse 60   Temp 97 6 °F (36 4 °C)   Resp 16   Ht 5' 6" (1 676 m)   Wt 59 7 kg (131 lb 9 6 oz)   LMP 09/20/2018 (Exact Date)   BMI 21 24 kg/m²      Physical Exam   Constitutional: She appears well-developed and well-nourished  HENT:   Head: Normocephalic and atraumatic     Right Ear: External ear normal    Left Ear: External ear normal  Mouth/Throat: Oropharynx is clear and moist    Neck: Normal range of motion  Cardiovascular: Normal rate, regular rhythm and normal heart sounds  No murmur heard  Pulmonary/Chest: Effort normal and breath sounds normal  No respiratory distress  She has no wheezes  She has no rales  Abdominal: Soft  Bowel sounds are normal  She exhibits no distension  There is no tenderness  There is no rebound  Genitourinary: Vagina normal  No breast swelling, tenderness, discharge or bleeding  There is no rash, tenderness or lesion on the right labia  There is no rash, tenderness or lesion on the left labia  No tenderness in the vagina  No vaginal discharge found  Genitourinary Comments: Uterus - non-tender  Ovaries - no palpable masses   Nursing note and vitals reviewed           Visual Acuity Screening    Right eye Left eye Both eyes   Without correction:      With correction: 20/20 20/20 20/20       Health Maintenance     Health Maintenance   Topic Date Due    PAP SMEAR  07/07/2018    INFLUENZA VACCINE  09/01/2018    DTaP,Tdap,and Td Vaccines (7 - Td) 01/23/2019    Depression Screening PHQ  09/27/2019     Immunization History   Administered Date(s) Administered    DTP 01/05/1993, 02/03/1993, 05/04/1993, 05/17/1994, 04/24/1997    HPV Quadrivalent 01/23/2009, 03/23/2009, 06/19/2009    Hep B, adult 11/16/1993, 12/21/1993, 05/17/1994    Hib (HbOC) 01/05/1993, 02/03/1993, 05/04/1993, 02/15/1994    MMR 02/15/1994, 04/24/1997    Meningococcal, Unknown Serogroups 01/23/2009    OPV 01/05/1993, 02/03/1993, 02/15/1994, 04/24/1997    Tdap 01/23/2009    Tuberculin Skin Test-PPD Intradermal 11/16/1993    Varicella 08/17/2000, 03/23/2009       DO NATALY Javier [facility]

## 2018-10-01 LAB
CYTOLOGIST CVX/VAG CYTO: NORMAL
DX ICD CODE: NORMAL
OTHER STN SPEC: NORMAL
OTHER STN SPEC: NORMAL
PATH REPORT.FINAL DX SPEC: NORMAL
SL AMB NOTE:: NORMAL
SL AMB SPECIMEN ADEQUACY: NORMAL
SL AMB TEST METHODOLOGY: NORMAL

## 2018-10-16 DIAGNOSIS — Z30.011 ORAL CONTRACEPTIVE PRESCRIBED: ICD-10-CM

## 2018-10-16 RX ORDER — NORGESTIMATE AND ETHINYL ESTRADIOL 7DAYSX3 28
KIT ORAL
Qty: 28 TABLET | Refills: 11 | Status: SHIPPED | OUTPATIENT
Start: 2018-10-16 | End: 2019-09-17 | Stop reason: SDUPTHER

## 2018-12-05 ENCOUNTER — OFFICE VISIT (OUTPATIENT)
Dept: FAMILY MEDICINE CLINIC | Facility: CLINIC | Age: 28
End: 2018-12-05
Payer: COMMERCIAL

## 2018-12-05 VITALS
BODY MASS INDEX: 21.69 KG/M2 | TEMPERATURE: 97.8 F | DIASTOLIC BLOOD PRESSURE: 64 MMHG | SYSTOLIC BLOOD PRESSURE: 100 MMHG | HEART RATE: 104 BPM | WEIGHT: 135 LBS | HEIGHT: 66 IN | RESPIRATION RATE: 16 BRPM

## 2018-12-05 DIAGNOSIS — J02.9 ACUTE PHARYNGITIS, UNSPECIFIED ETIOLOGY: Primary | ICD-10-CM

## 2018-12-05 LAB — S PYO AG THROAT QL: NEGATIVE

## 2018-12-05 PROCEDURE — 3008F BODY MASS INDEX DOCD: CPT | Performed by: NURSE PRACTITIONER

## 2018-12-05 PROCEDURE — 87880 STREP A ASSAY W/OPTIC: CPT | Performed by: NURSE PRACTITIONER

## 2018-12-05 PROCEDURE — 99213 OFFICE O/P EST LOW 20 MIN: CPT | Performed by: NURSE PRACTITIONER

## 2018-12-05 RX ORDER — AZITHROMYCIN 250 MG/1
TABLET, FILM COATED ORAL
Qty: 6 TABLET | Refills: 0 | Status: SHIPPED | OUTPATIENT
Start: 2018-12-05 | End: 2018-12-10

## 2018-12-05 NOTE — PATIENT INSTRUCTIONS
Take medication with food  It is important that you take the entire course of antibiotics prescribed  May also take a probiotic of your choice to maintain healthy GI josette  Can take some probiotic and yogurt with the medication  Gargle with warm salt water for 5 minutes every 4 hours  Drink plenty of fluids at least 6 glasses of water a day  Can use some honey lemon tea  Call or follow up if symptoms are not better in 7 days  Supportive care discussed and advised  Follow up for no improvement and worsening of conditions  Patient advised and educated when to see immediate medical care

## 2018-12-05 NOTE — PROGRESS NOTES
Subjective:      Patient ID: Uriah Lopez is a 34 y o  female  Chief Complaint   Patient presents with    Sore Throat     Started 2 days ago with sore throat, chills and body aches but didn't check her temp Tianna SOLOMON       HPI  Patient started with sore throat,chills and congestion about 2 days ago  Stated that symptoms are getting worse  Denies fever,and sob  Currently not taking any OTC  The following portions of the patient's history were reviewed and updated as appropriate: allergies, current medications, past family history, past medical history, past social history, past surgical history and problem list       Review of Systems   Constitutional: Positive for chills  Negative for fatigue and fever  HENT: Positive for congestion and sore throat  Negative for ear discharge, ear pain, facial swelling, hearing loss, mouth sores, nosebleeds, postnasal drip, rhinorrhea, sinus pain, sinus pressure, sneezing, trouble swallowing and voice change  Respiratory: Negative for cough, chest tightness, shortness of breath and wheezing  Cardiovascular: Negative  Gastrointestinal: Negative for abdominal pain, constipation, diarrhea and nausea  Neurological: Negative for dizziness, weakness, light-headedness and headaches  Objective:    History   Smoking Status    Never Smoker   Smokeless Tobacco    Never Used       Allergies: No Known Allergies    Vitals:  /64   Pulse 104   Temp 97 8 °F (36 6 °C)   Resp 16   Ht 5' 6" (1 676 m)   Wt 61 2 kg (135 lb)   LMP 11/13/2018   BMI 21 79 kg/m²     Current Outpatient Prescriptions   Medication Sig Dispense Refill    TRI-SPRINTEC 0 18/0 215/0 25 MG-35 MCG per tablet TAKE ONE TABLET BY MOUTH DAILY 28 tablet 11    azithromycin (ZITHROMAX) 250 mg tablet Take 500mg on day 1, 250mg on days 2-5 6 tablet 0     No current facility-administered medications for this visit             Physical Exam   Constitutional: She is oriented to person, place, and time  She appears well-developed and well-nourished  HENT:   Head: Normocephalic  Right Ear: Tympanic membrane, external ear and ear canal normal    Left Ear: Tympanic membrane, external ear and ear canal normal    Nose: Nose normal  Right sinus exhibits no maxillary sinus tenderness and no frontal sinus tenderness  Left sinus exhibits no maxillary sinus tenderness and no frontal sinus tenderness  Mouth/Throat: Mucous membranes are normal  Posterior oropharyngeal erythema present  Neck: Neck supple  Cardiovascular: Normal rate, regular rhythm and normal heart sounds  Pulmonary/Chest: Effort normal and breath sounds normal    Abdominal: Normal appearance and bowel sounds are normal  There is no hepatosplenomegaly  There is no tenderness  There is no rebound  Musculoskeletal: Normal range of motion  Lymphadenopathy:     She has cervical adenopathy  Right cervical: Superficial cervical adenopathy present  No posterior cervical adenopathy present  Left cervical: Superficial cervical adenopathy present  No posterior cervical adenopathy present  Neurological: She is alert and oriented to person, place, and time  Skin: Skin is warm and dry  Psychiatric: She has a normal mood and affect  Her behavior is normal  Judgment and thought content normal    Vitals reviewed  Assessment/Plan:         Diagnoses and all orders for this visit:    Acute pharyngitis, unspecified etiology  -     azithromycin (ZITHROMAX) 250 mg tablet; Take 500mg on day 1, 250mg on days 2-5  -     POCT rapid strepA    BMI 21 0-21 9, adult        Recent Results (from the past 24 hour(s))   POCT rapid strepA    Collection Time: 12/05/18 12:42 PM   Result Value Ref Range     RAPID STREP A Negative Negative         Patient Instructions: Take medication with food  It is important that you take the entire course of antibiotics prescribed    May also take a probiotic of your choice to maintain healthy GI josette  Can take some probiotic and yogurt with the medication  Gargle with warm salt water for 5 minutes every 4 hours  Drink plenty of fluids at least 6 glasses of water a day  Can use some honey lemon tea  Call or follow up if symptoms are not better in 7 days  Supportive care discussed and advised  Follow up for no improvement and worsening of conditions  Patient advised and educated when to see immediate medical care  Return if symptoms worsen or fail to improve        PATRICIA Romero

## 2019-04-02 ENCOUNTER — TELEPHONE (OUTPATIENT)
Dept: FAMILY MEDICINE CLINIC | Facility: CLINIC | Age: 29
End: 2019-04-02

## 2019-04-02 DIAGNOSIS — B37.3 VAGINA, CANDIDIASIS: Primary | ICD-10-CM

## 2019-04-02 RX ORDER — FLUCONAZOLE 150 MG/1
150 TABLET ORAL ONCE
Qty: 1 TABLET | Refills: 0 | Status: SHIPPED | OUTPATIENT
Start: 2019-04-02 | End: 2019-04-02

## 2019-07-02 ENCOUNTER — OFFICE VISIT (OUTPATIENT)
Dept: FAMILY MEDICINE CLINIC | Facility: CLINIC | Age: 29
End: 2019-07-02
Payer: COMMERCIAL

## 2019-07-02 VITALS
DIASTOLIC BLOOD PRESSURE: 70 MMHG | SYSTOLIC BLOOD PRESSURE: 116 MMHG | HEIGHT: 66 IN | RESPIRATION RATE: 18 BRPM | WEIGHT: 132 LBS | HEART RATE: 78 BPM | TEMPERATURE: 98.4 F | BODY MASS INDEX: 21.21 KG/M2

## 2019-07-02 DIAGNOSIS — R10.9 FLANK PAIN: Primary | ICD-10-CM

## 2019-07-02 DIAGNOSIS — R31.0 GROSS HEMATURIA: ICD-10-CM

## 2019-07-02 LAB
SL AMB  POCT GLUCOSE, UA: NORMAL
SL AMB LEUKOCYTE ESTERASE,UA: ABNORMAL
SL AMB POCT BILIRUBIN,UA: ABNORMAL
SL AMB POCT BLOOD,UA: ABNORMAL
SL AMB POCT CLARITY,UA: ABNORMAL
SL AMB POCT COLOR,UA: YELLOW
SL AMB POCT KETONES,UA: ABNORMAL
SL AMB POCT NITRITE,UA: ABNORMAL
SL AMB POCT PH,UA: 8
SL AMB POCT SPECIFIC GRAVITY,UA: 1
SL AMB POCT URINE PROTEIN: ABNORMAL
SL AMB POCT UROBILINOGEN: NORMAL

## 2019-07-02 PROCEDURE — 1036F TOBACCO NON-USER: CPT | Performed by: NURSE PRACTITIONER

## 2019-07-02 PROCEDURE — 3008F BODY MASS INDEX DOCD: CPT | Performed by: NURSE PRACTITIONER

## 2019-07-02 PROCEDURE — 81003 URINALYSIS AUTO W/O SCOPE: CPT | Performed by: NURSE PRACTITIONER

## 2019-07-02 PROCEDURE — 99214 OFFICE O/P EST MOD 30 MIN: CPT | Performed by: NURSE PRACTITIONER

## 2019-07-02 NOTE — PROGRESS NOTES
Assessment/Plan:     CT ordered to r/o nephrolithiasis  Discussed hydrating and filtering urine for stones  May take Azo OTC for dysuria  Will send urine for culture  ER for any worsening symptoms  Will f/u with results of testing  Problem List Items Addressed This Visit     None      Visit Diagnoses     Flank pain    -  Primary    Relevant Orders    POCT urine dip auto non-scope (Completed)    Urine culture    CT renal stone study abdomen pelvis wo contrast    Gross hematuria        Relevant Orders    CT renal stone study abdomen pelvis wo contrast                There are no Patient Instructions on file for this visit  Return if symptoms worsen or fail to improve  Subjective:      Patient ID: Andria Murillo is a 34 y o  female  Chief Complaint   Patient presents with    Urinary Tract Infection     [id number]       Last week she developed bilateral flank pain R>L, which has subsided  She noticed her urine started to smell strong, like sulfur  Yesterday, she developed hematuria and frequency  She also has mild dysuria  Denies fevers, n/v  No history of kidney stones  She has had UTIs on occasion in the past   Not taking anything OTC for symptoms  The following portions of the patient's history were reviewed and updated as appropriate: allergies, current medications, past family history, past medical history, past social history, past surgical history and problem list     Review of Systems   Constitutional: Negative for chills, fatigue and fever  Respiratory: Negative for cough and shortness of breath  Cardiovascular: Negative for chest pain  Gastrointestinal: Negative for abdominal pain, diarrhea, nausea and vomiting  Genitourinary: Positive for dysuria, flank pain, frequency and hematuria  Negative for pelvic pain, urgency and vaginal discharge  Musculoskeletal: Negative for arthralgias and back pain           Current Outpatient Medications   Medication Sig Dispense Refill  TRI-SPRINTEC 0 18/0 215/0 25 MG-35 MCG per tablet TAKE ONE TABLET BY MOUTH DAILY 28 tablet 11     No current facility-administered medications for this visit  Objective:    /70   Pulse 78   Temp 98 4 °F (36 9 °C)   Resp 18   Ht 5' 6" (1 676 m)   Wt 59 9 kg (132 lb)   LMP 06/25/2019 (Approximate)   BMI 21 31 kg/m²        Physical Exam   Constitutional: She appears well-developed and well-nourished  Cardiovascular: Normal rate, regular rhythm, S1 normal and S2 normal    Pulmonary/Chest: Effort normal and breath sounds normal    Abdominal: Bowel sounds are normal  She exhibits no distension  There is no hepatosplenomegaly  There is no tenderness  There is no CVA tenderness  Skin: Skin is warm, dry and intact  Psychiatric: She has a normal mood and affect                Ely Mejia

## 2019-07-05 ENCOUNTER — TELEPHONE (OUTPATIENT)
Dept: FAMILY MEDICINE CLINIC | Facility: CLINIC | Age: 29
End: 2019-07-05

## 2019-07-05 DIAGNOSIS — N39.0 ACUTE UTI: Primary | ICD-10-CM

## 2019-07-05 RX ORDER — CIPROFLOXACIN 250 MG/1
250 TABLET, FILM COATED ORAL EVERY 12 HOURS SCHEDULED
Qty: 6 TABLET | Refills: 0 | Status: SHIPPED | OUTPATIENT
Start: 2019-07-05 | End: 2019-07-08

## 2019-07-05 RX ORDER — CIPROFLOXACIN 250 MG/1
250 TABLET, FILM COATED ORAL EVERY 12 HOURS SCHEDULED
Qty: 6 TABLET | Refills: 0 | Status: SHIPPED | OUTPATIENT
Start: 2019-07-05 | End: 2019-07-05 | Stop reason: SDUPTHER

## 2019-07-05 NOTE — TELEPHONE ENCOUNTER
L/M for pt to call back regarding urine culture  It did come back showing an infection  I sent antibiotics to her pharmacy  She does not need to go for the CT scan   Mohan Revel

## 2019-09-17 DIAGNOSIS — Z30.011 ORAL CONTRACEPTIVE PRESCRIBED: ICD-10-CM

## 2019-09-17 RX ORDER — NORGESTIMATE AND ETHINYL ESTRADIOL 7DAYSX3 28
KIT ORAL
Qty: 28 TABLET | Refills: 1 | Status: SHIPPED | OUTPATIENT
Start: 2019-09-17 | End: 2019-11-09 | Stop reason: SDUPTHER

## 2019-11-09 DIAGNOSIS — Z30.011 ORAL CONTRACEPTIVE PRESCRIBED: ICD-10-CM

## 2019-11-10 RX ORDER — NORGESTIMATE AND ETHINYL ESTRADIOL 7DAYSX3 28
KIT ORAL
Qty: 28 TABLET | Refills: 0 | Status: SHIPPED | OUTPATIENT
Start: 2019-11-10 | End: 2019-12-16 | Stop reason: SDUPTHER

## 2019-11-11 NOTE — TELEPHONE ENCOUNTER
I refilled the patient's medication, but they are due for an appointment    Please ask them to schedule a CPE  Thank you,  Lyla Reece, DO

## 2019-11-11 NOTE — TELEPHONE ENCOUNTER
Spoke with pt and she is aware the med is refilled, she will call back to [facility] CPE

## 2019-12-10 ENCOUNTER — OFFICE VISIT (OUTPATIENT)
Dept: FAMILY MEDICINE CLINIC | Facility: CLINIC | Age: 29
End: 2019-12-10
Payer: COMMERCIAL

## 2019-12-10 VITALS
OXYGEN SATURATION: 97 % | DIASTOLIC BLOOD PRESSURE: 80 MMHG | RESPIRATION RATE: 16 BRPM | SYSTOLIC BLOOD PRESSURE: 120 MMHG | HEART RATE: 95 BPM | TEMPERATURE: 99.3 F | WEIGHT: 130 LBS | BODY MASS INDEX: 20.89 KG/M2 | HEIGHT: 66 IN

## 2019-12-10 DIAGNOSIS — J06.9 ACUTE UPPER RESPIRATORY INFECTION: Primary | ICD-10-CM

## 2019-12-10 LAB — S PYO AG THROAT QL: NEGATIVE

## 2019-12-10 PROCEDURE — 3008F BODY MASS INDEX DOCD: CPT | Performed by: NURSE PRACTITIONER

## 2019-12-10 PROCEDURE — 99213 OFFICE O/P EST LOW 20 MIN: CPT | Performed by: NURSE PRACTITIONER

## 2019-12-10 PROCEDURE — 87880 STREP A ASSAY W/OPTIC: CPT | Performed by: NURSE PRACTITIONER

## 2019-12-10 PROCEDURE — 1036F TOBACCO NON-USER: CPT | Performed by: NURSE PRACTITIONER

## 2019-12-10 NOTE — PROGRESS NOTES
Assessment/Plan:    Reviewed supportive care of viral illness  Recommended Mucinex D, salt water gargles, saline nasal rinses, and push fluids  RTO prn    1  Acute upper respiratory infection  -     POCT rapid strepA            Patient Instructions   Drinking plenty of fluids, saline nasal rinses or nasal spray, and steam or cool air humidification are all effective ways of managing symptoms  You may take Mucinex D for sinus congestion, or Coricidin HBP if you have a heart condition or high blood pressure  Mucinex or Robitussin DM are used to control cough symptoms and include an expectorant  You may take Ibuprofen or Tylenol as needed for pain or fevers  Recommend recheck if symptoms do not resolve or improve within 1 week  Return if symptoms worsen or fail to improve  Subjective:      Patient ID: Sadiq Flores is a 34 y o  female  Chief Complaint   Patient presents with    Sore Throat     [id number]       Here today with complaints of sinus congestion, sore throat, and neck pain that started yesterday  She has body aches and chills  Denies cough or chest congestion  She has not taken anything OTC for her symptoms  The following portions of the patient's history were reviewed and updated as appropriate: allergies, current medications, past family history, past medical history, past social history, past surgical history and problem list     Review of Systems   Constitutional: Positive for fatigue  Negative for chills and fever  HENT: Positive for congestion and sore throat  Negative for ear pain, postnasal drip, rhinorrhea and sinus pressure  Respiratory: Negative for cough, shortness of breath and wheezing  Cardiovascular: Negative for chest pain  Gastrointestinal: Negative for abdominal pain, diarrhea, nausea and vomiting  Musculoskeletal: Negative for arthralgias  Skin: Negative for rash  Neurological: Negative for headaches           Current Outpatient Medications   Medication Sig Dispense Refill    TRI-SPRINTEC 0 18/0 215/0 25 MG-35 MCG per tablet TAKE ONE TABLET BY MOUTH DAILY 28 tablet 0     No current facility-administered medications for this visit  Objective:    /80   Pulse 95   Temp 99 3 °F (37 4 °C)   Resp 16   Ht 5' 6" (1 676 m)   Wt 59 kg (130 lb)   LMP 12/10/2019   SpO2 97%   BMI 20 98 kg/m²        Physical Exam   Constitutional: She appears well-developed and well-nourished  HENT:   Head: Normocephalic and atraumatic  Right Ear: Tympanic membrane, external ear and ear canal normal    Left Ear: Tympanic membrane, external ear and ear canal normal    Nose: No mucosal edema or rhinorrhea  Mouth/Throat: Uvula is midline, oropharynx is clear and moist and mucous membranes are normal  No tonsillar exudate  Post nasal drip   Eyes: Conjunctivae are normal    Neck: Neck supple  No edema present  No thyromegaly present  Cardiovascular: Normal rate, regular rhythm, normal heart sounds and intact distal pulses  No murmur heard  Pulmonary/Chest: Effort normal and breath sounds normal    Abdominal: Bowel sounds are normal  She exhibits no distension  There is no splenomegaly or hepatomegaly  There is no tenderness  Lymphadenopathy:     She has cervical adenopathy  Right cervical: Superficial cervical adenopathy present  Left cervical: Superficial cervical adenopathy present  Skin: Skin is warm, dry and intact  No rash noted  Psychiatric: She has a normal mood and affect  Nursing note and vitals reviewed               Lissy Peralta

## 2019-12-10 NOTE — PATIENT INSTRUCTIONS
Drinking plenty of fluids, saline nasal rinses or nasal spray, and steam or cool air humidification are all effective ways of managing symptoms  You may take Mucinex D for sinus congestion, or Coricidin HBP if you have a heart condition or high blood pressure  Mucinex or Robitussin DM are used to control cough symptoms and include an expectorant  You may take Ibuprofen or Tylenol as needed for pain or fevers  Recommend recheck if symptoms do not resolve or improve within 1 week

## 2019-12-16 DIAGNOSIS — Z30.011 ORAL CONTRACEPTIVE PRESCRIBED: ICD-10-CM

## 2019-12-16 RX ORDER — NORGESTIMATE AND ETHINYL ESTRADIOL 7DAYSX3 28
1 KIT ORAL DAILY
Qty: 28 TABLET | Refills: 1 | Status: SHIPPED | OUTPATIENT
Start: 2019-12-16 | End: 2020-01-09 | Stop reason: SDUPTHER

## 2020-01-07 ENCOUNTER — TELEPHONE (OUTPATIENT)
Dept: OTHER | Facility: OTHER | Age: 30
End: 2020-01-07

## 2020-01-07 ENCOUNTER — OFFICE VISIT (OUTPATIENT)
Dept: URGENT CARE | Facility: CLINIC | Age: 30
End: 2020-01-07
Payer: COMMERCIAL

## 2020-01-07 VITALS
OXYGEN SATURATION: 96 % | TEMPERATURE: 101.9 F | SYSTOLIC BLOOD PRESSURE: 100 MMHG | WEIGHT: 128.2 LBS | BODY MASS INDEX: 20.6 KG/M2 | HEIGHT: 66 IN | RESPIRATION RATE: 18 BRPM | HEART RATE: 118 BPM | DIASTOLIC BLOOD PRESSURE: 60 MMHG

## 2020-01-07 DIAGNOSIS — J11.1 INFLUENZA: Primary | ICD-10-CM

## 2020-01-07 PROCEDURE — 99213 OFFICE O/P EST LOW 20 MIN: CPT | Performed by: PHYSICIAN ASSISTANT

## 2020-01-07 RX ORDER — OSELTAMIVIR PHOSPHATE 75 MG/1
75 CAPSULE ORAL 2 TIMES DAILY
Qty: 10 CAPSULE | Refills: 0 | Status: SHIPPED | OUTPATIENT
Start: 2020-01-07 | End: 2020-01-12

## 2020-01-07 NOTE — LETTER
January 7, 2020     Patient: Raquel Dalton   YOB: 1990   Date of Visit: 1/7/2020       To Whom it May Concern:    Fabiola Kirk was seen in my clinic on 1/7/2020  She may return to work on 1/11/2020  If you have any questions or concerns, please don't hesitate to call           Sincerely,          Anca Finch PA-C        CC: No Recipients

## 2020-01-07 NOTE — PROGRESS NOTES
[address] [address] [facility]        NAME: Johanna Patterson is a 34 y o  female  : 1990    MRN: [id number]  DATE: 2020  TIME: 12:50 PM    Assessment and Plan   Influenza [J11 1]  1  Influenza  oseltamivir (TAMIFLU) 75 mg capsule         Patient Instructions     Discussed condition with patient  Presentation is concerning for influenza  We discussed options and ultimately agreed to prescribe her Tamiflu and recommend hydration, rest, discussed fever management, OTC cough and cold meds, need to quarantine and wear mask if must go out, and close observation  Follow up with PCP in 3-5 days  Proceed to  ER if symptoms worsen  Chief Complaint     Chief Complaint   Patient presents with    Generalized Body Aches     Started yesterday with HA, bodya ches, fever - today 103 at 7 am (no OTC meds taken)  Scratchy throat with sl  cough  Notes LUQ abd  pain but denies N/V or diarrhea  No Flu vaccine  Father has Flu   Fever         History of Present Illness       Patient presents with rapid onset yesterday of fever, chills, myalgias, headache, dry cough and itchy throat  Denies significant nasal congestion, N/V/D but she does have some upper abdominal discomfort  Her father was recently diagnosed with influenza  She has not taken anything for the symptoms  Denies asthma/allergies  She does not smoke or vape  She has not received flu vaccine  Review of Systems   Review of Systems   Constitutional: Positive for fever  HENT: Positive for postnasal drip and sore throat  Respiratory: Positive for cough  Gastrointestinal: Positive for abdominal pain  Negative for diarrhea, nausea and vomiting  Musculoskeletal: Positive for myalgias  Neurological: Positive for headaches           Current Medications       Current Outpatient Medications:     norgestimate-ethinyl estradiol (TRI-SPRINTEC) 0 18/0 215/0 25 MG-35 MCG per tablet, Take 1 tablet by mouth daily, Disp: 28 tablet, Rfl: 0    Current Allergies     Allergies as of 01/07/2020    (No Known Allergies)            The following portions of the patient's history were reviewed and updated as appropriate: allergies, current medications, past family history, past medical history, past social history, past surgical history and problem list      Past Medical History:   Diagnosis Date    Anal fissure     Last assessed 12/19/2013   Bill Junk bladder disease     Skin lesion     Last assessed 5/20/2016       Past Surgical History:   Procedure Laterality Date    CHOLECYSTECTOMY      WISDOM TOOTH EXTRACTION         Family History   Problem Relation Age of Onset    Other Mother     No Known Problems Father     Cholecystitis Sister     Mental illness Neg Hx          Medications have been verified  Objective   /60   Pulse (!) 118   Temp (!) 101 9 °F (38 8 °C)   Resp 18   Ht 5' 6" (1 676 m)   Wt 58 2 kg (128 lb 3 2 oz)   LMP 12/10/2019   SpO2 96%   BMI 20 69 kg/m²        Physical Exam     Physical Exam   Constitutional: She is oriented to person, place, and time  She appears well-developed and well-nourished  No distress  Patient ill-appearing   HENT:   Right Ear: Hearing, tympanic membrane, external ear and ear canal normal    Left Ear: Hearing, tympanic membrane, external ear and ear canal normal    Nose: Mucosal edema (B/L boggy turbinates) present  Mouth/Throat: Mucous membranes are normal  Posterior oropharyngeal erythema (PND) present  No oropharyngeal exudate  Neck: Neck supple  Cardiovascular: Normal rate, regular rhythm and normal heart sounds  Pulmonary/Chest: Effort normal and breath sounds normal    Abdominal: Soft  Bowel sounds are normal  She exhibits no distension  There is tenderness (Mild epigastric TTP)  There is no rebound and no guarding  Lymphadenopathy:     She has no cervical adenopathy  Neurological: She is alert and oriented to person, place, and time  Skin: There is pallor     Psychiatric: She has a normal mood and affect  Vitals reviewed

## 2020-01-07 NOTE — PATIENT INSTRUCTIONS
Influenza   WHAT YOU NEED TO KNOW:   Influenza (the flu) is an infection caused by the influenza virus  The flu is easily spread when an infected person coughs, sneezes, or has close contact with others  You may be able to spread the flu to others for 1 week or longer after signs or symptoms appear  DISCHARGE INSTRUCTIONS:   Call 911 for any of the following:   · You have trouble breathing, and your lips look purple or blue  · You have a seizure  Return to the emergency department if:   · You are dizzy, or you are urinating less or not at all  · You have a headache with a stiff neck, and you feel tired or confused  · You have new pain or pressure in your chest     · Your symptoms, such as shortness of breath, vomiting, or diarrhea, get worse  · Your symptoms, such as fever and coughing, seem to get better, but then get worse  Contact your healthcare provider if:   · You have new muscle pain or weakness  · You have questions or concerns about your condition or care  Medicines: You may need any of the following:  · Acetaminophen  decreases pain and fever  It is available without a doctor's order  Ask how much to take and how often to take it  Follow directions  Acetaminophen can cause liver damage if not taken correctly  · NSAIDs , such as ibuprofen, help decrease swelling, pain, and fever  This medicine is available with or without a doctor's order  NSAIDs can cause stomach bleeding or kidney problems in certain people  If you take blood thinner medicine, always ask your healthcare provider if NSAIDs are safe for you  Always read the medicine label and follow directions  · Antivirals  help fight a viral infection  · Take your medicine as directed  Contact your healthcare provider if you think your medicine is not helping or if you have side effects  Tell him or her if you are allergic to any medicine  Keep a list of the medicines, vitamins, and herbs you take   Include the amounts, and when and why you take them  Bring the list or the pill bottles to follow-up visits  Carry your medicine list with you in case of an emergency  Rest  as much as you can to help you recover  Drink liquids as directed  to help prevent dehydration  Ask how much liquid to drink each day and which liquids are best for you  Prevent the spread of influenza:   · Wash your hands often  Use soap and water  Wash your hands after you use the bathroom, change a child's diapers, or sneeze  Wash your hands before you prepare or eat food  Use gel hand cleanser when soap and water are not available  Do not touch your eyes, nose, or mouth unless you have washed your hands first            · Cover your mouth when you sneeze or cough  Cough into a tissue or the bend of your arm  · Clean shared items with a germ-killing   Clean table surfaces, doorknobs, and light switches  Do not share towels, silverware, and dishes with people who are sick  Wash bed sheets, towels, silverware, and dishes with soap and water  · Wear a mask  over your mouth and nose if you are sick or are near anyone who is sick  · Stay away from others  if you are sick  · Influenza vaccine  helps prevent influenza (flu)  Everyone older than 6 months should get a yearly influenza vaccine  Get the vaccine as soon as it is available, usually in September or October each year  Follow up with your healthcare provider as directed:  Write down your questions so you remember to ask them during your visits  © 2017 [employer] Information is for End User's use only and may not be sold, redistributed or otherwise used for commercial purposes  All illustrations and images included in CareNotes® are the copyrighted property of [employer]  or [employer]barra  The above information is an  only  It is not intended as medical advice for individual conditions or treatments   Talk to your doctor, nurse or pharmacist before following any medical regimen to see if it is safe and effective for you

## 2020-01-09 DIAGNOSIS — Z30.011 ORAL CONTRACEPTIVE PRESCRIBED: ICD-10-CM

## 2020-01-10 RX ORDER — NORGESTIMATE AND ETHINYL ESTRADIOL 7DAYSX3 28
1 KIT ORAL DAILY
Qty: 28 TABLET | Refills: 0 | Status: SHIPPED | OUTPATIENT
Start: 2020-01-10 | End: 2020-01-17 | Stop reason: SDUPTHER

## 2020-01-17 ENCOUNTER — OFFICE VISIT (OUTPATIENT)
Dept: FAMILY MEDICINE CLINIC | Facility: CLINIC | Age: 30
End: 2020-01-17
Payer: COMMERCIAL

## 2020-01-17 VITALS
RESPIRATION RATE: 16 BRPM | DIASTOLIC BLOOD PRESSURE: 60 MMHG | BODY MASS INDEX: 20.41 KG/M2 | HEIGHT: 66 IN | WEIGHT: 127 LBS | HEART RATE: 68 BPM | TEMPERATURE: 98.6 F | SYSTOLIC BLOOD PRESSURE: 100 MMHG

## 2020-01-17 DIAGNOSIS — Z30.011 ORAL CONTRACEPTIVE PRESCRIBED: ICD-10-CM

## 2020-01-17 DIAGNOSIS — Z00.00 ANNUAL PHYSICAL EXAM: Primary | ICD-10-CM

## 2020-01-17 PROCEDURE — 3008F BODY MASS INDEX DOCD: CPT | Performed by: FAMILY MEDICINE

## 2020-01-17 PROCEDURE — 99395 PREV VISIT EST AGE 18-39: CPT | Performed by: FAMILY MEDICINE

## 2020-01-17 RX ORDER — NORGESTIMATE AND ETHINYL ESTRADIOL 7DAYSX3 28
1 KIT ORAL DAILY
Qty: 84 TABLET | Refills: 4 | Status: SHIPPED | OUTPATIENT
Start: 2020-01-17 | End: 2020-03-07 | Stop reason: SDUPTHER

## 2020-01-17 NOTE — PROGRESS NOTES
history, past surgical history and problem list     Review of Systems     Review of Systems   Constitutional: Negative for fatigue  Respiratory: Negative  Cardiovascular: Negative          Past Medical History     Past Medical History:   Diagnosis Date    Anal fissure     Last assessed 12/19/2013   Mabel Gomez bladder disease     Skin lesion     Last assessed 5/20/2016       Past Surgical History     Past Surgical History:   Procedure Laterality Date    CHOLECYSTECTOMY      WISDOM TOOTH EXTRACTION         Social History     Social History     Socioeconomic History    Marital status: Single     Spouse name: None    Number of children: None    Years of education: None    Highest education level: None   Occupational History    None   Social Needs    Financial resource strain: None    Food insecurity:     Worry: None     Inability: None    Transportation needs:     Medical: None     Non-medical: None   Tobacco Use    Smoking status: Never Smoker    Smokeless tobacco: Never Used   Substance and Sexual Activity    Alcohol use: Yes     Frequency: Monthly or less     Drinks per session: 1 or 2     Binge frequency: Never     Comment: social - shots - 5 x month    Drug use: No    Sexual activity: None   Lifestyle    Physical activity:     Days per week: None     Minutes per session: None    Stress: None   Relationships    Social connections:     Talks on phone: None     Gets together: None     Attends [employer] service: None     Active member of club or organization: None     Attends meetings of clubs or organizations: None     Relationship status: None    Intimate partner violence:     Fear of current or ex partner: None     Emotionally abused: None     Physically abused: None     Forced sexual activity: None   Other Topics Concern    None   Social History Narrative    None       Family History     Family History   Problem Relation Age of Onset    Other Mother     No Known Problems Father     Cholecystitis Sister     Mental illness Neg Hx        Current Medications       Current Outpatient Medications:     norgestimate-ethinyl estradiol (TRI-SPRINTEC) 0 18/0 215/0 25 MG-35 MCG per tablet, Take 1 tablet by mouth daily, Disp: 84 tablet, Rfl: 4     Allergies     No Known Allergies    Objective     /60   Pulse 68   Temp 98 6 °F (37 °C)   Resp 16   Ht 5' 6" (1 676 m)   Wt 57 6 kg (127 lb)   BMI 20 50 kg/m²      Physical Exam   Constitutional: She is oriented to person, place, and time  She appears well-developed and well-nourished  HENT:   Head: Normocephalic and atraumatic  Right Ear: External ear normal    Left Ear: External ear normal    Mouth/Throat: Oropharynx is clear and moist    Eyes: Pupils are equal, round, and reactive to light  Neck: Normal range of motion  Cardiovascular: Normal rate, regular rhythm and normal heart sounds  Exam reveals no friction rub  No murmur heard  Pulmonary/Chest: Effort normal and breath sounds normal  No respiratory distress  She has no wheezes  She has no rales  Abdominal: Soft  Bowel sounds are normal  She exhibits no distension and no mass  There is no tenderness  There is no rebound and no guarding  Musculoskeletal: Normal range of motion  She exhibits no edema  Neurological: She is alert and oriented to person, place, and time  She has normal reflexes  Skin: Skin is warm  Nursing note and vitals reviewed           Visual Acuity Screening    Right eye Left eye Both eyes   Without correction:      With correction: 20/15 20/20 20/20           Patrick Giles, [address]

## 2020-03-07 DIAGNOSIS — Z30.011 ORAL CONTRACEPTIVE PRESCRIBED: ICD-10-CM

## 2020-03-07 RX ORDER — NORGESTIMATE AND ETHINYL ESTRADIOL 7DAYSX3 28
1 KIT ORAL DAILY
Qty: 84 TABLET | Refills: 4 | Status: SHIPPED | OUTPATIENT
Start: 2020-03-07 | End: 2020-04-01 | Stop reason: SDUPTHER

## 2020-04-01 DIAGNOSIS — Z30.011 ORAL CONTRACEPTIVE PRESCRIBED: ICD-10-CM

## 2020-04-01 RX ORDER — NORGESTIMATE AND ETHINYL ESTRADIOL 7DAYSX3 28
1 KIT ORAL DAILY
Qty: 84 TABLET | Refills: 0 | Status: SHIPPED | OUTPATIENT
Start: 2020-04-01 | End: 2020-04-28 | Stop reason: SDUPTHER

## 2020-04-28 DIAGNOSIS — Z30.011 ORAL CONTRACEPTIVE PRESCRIBED: ICD-10-CM

## 2020-04-28 RX ORDER — NORGESTIMATE AND ETHINYL ESTRADIOL 7DAYSX3 28
1 KIT ORAL DAILY
Qty: 84 TABLET | Refills: 2 | Status: SHIPPED | OUTPATIENT
Start: 2020-04-28 | End: 2020-05-26 | Stop reason: SDUPTHER

## 2020-05-26 DIAGNOSIS — Z30.011 ORAL CONTRACEPTIVE PRESCRIBED: ICD-10-CM

## 2020-05-26 RX ORDER — NORGESTIMATE AND ETHINYL ESTRADIOL 7DAYSX3 28
1 KIT ORAL DAILY
Qty: 84 TABLET | Refills: 2 | Status: SHIPPED | OUTPATIENT
Start: 2020-05-26 | End: 2020-06-23 | Stop reason: SDUPTHER

## 2020-06-23 DIAGNOSIS — Z30.011 ORAL CONTRACEPTIVE PRESCRIBED: ICD-10-CM

## 2020-06-23 RX ORDER — NORGESTIMATE AND ETHINYL ESTRADIOL 7DAYSX3 28
1 KIT ORAL DAILY
Qty: 84 TABLET | Refills: 2 | Status: SHIPPED | OUTPATIENT
Start: 2020-06-23 | End: 2020-07-20 | Stop reason: SDUPTHER

## 2020-07-20 DIAGNOSIS — Z30.011 ORAL CONTRACEPTIVE PRESCRIBED: ICD-10-CM

## 2020-07-20 RX ORDER — NORGESTIMATE AND ETHINYL ESTRADIOL 7DAYSX3 28
1 KIT ORAL DAILY
Qty: 84 TABLET | Refills: 0 | Status: SHIPPED | OUTPATIENT
Start: 2020-07-20 | End: 2020-08-18 | Stop reason: SDUPTHER

## 2020-08-18 DIAGNOSIS — Z30.011 ORAL CONTRACEPTIVE PRESCRIBED: ICD-10-CM

## 2020-08-18 RX ORDER — NORGESTIMATE AND ETHINYL ESTRADIOL 7DAYSX3 28
1 KIT ORAL DAILY
Qty: 84 TABLET | Refills: 0 | Status: SHIPPED | OUTPATIENT
Start: 2020-08-18 | End: 2020-09-14 | Stop reason: SDUPTHER

## 2020-09-14 DIAGNOSIS — Z30.011 ORAL CONTRACEPTIVE PRESCRIBED: ICD-10-CM

## 2020-09-14 RX ORDER — NORGESTIMATE AND ETHINYL ESTRADIOL 7DAYSX3 28
1 KIT ORAL DAILY
Qty: 84 TABLET | Refills: 0 | Status: SHIPPED | OUTPATIENT
Start: 2020-09-14 | End: 2020-10-12 | Stop reason: SDUPTHER

## 2020-10-12 DIAGNOSIS — Z30.011 ORAL CONTRACEPTIVE PRESCRIBED: ICD-10-CM

## 2020-10-12 RX ORDER — NORGESTIMATE AND ETHINYL ESTRADIOL 7DAYSX3 28
1 KIT ORAL DAILY
Qty: 84 TABLET | Refills: 0 | Status: SHIPPED | OUTPATIENT
Start: 2020-10-12 | End: 2020-11-13 | Stop reason: SDUPTHER

## 2020-11-13 DIAGNOSIS — Z30.011 ORAL CONTRACEPTIVE PRESCRIBED: ICD-10-CM

## 2020-11-13 RX ORDER — NORGESTIMATE AND ETHINYL ESTRADIOL 7DAYSX3 28
1 KIT ORAL DAILY
Qty: 84 TABLET | Refills: 0 | Status: SHIPPED | OUTPATIENT
Start: 2020-11-13 | End: 2021-01-05 | Stop reason: SDUPTHER

## 2021-01-05 DIAGNOSIS — Z30.011 ORAL CONTRACEPTIVE PRESCRIBED: ICD-10-CM

## 2021-01-05 RX ORDER — NORGESTIMATE AND ETHINYL ESTRADIOL 7DAYSX3 28
1 KIT ORAL DAILY
Qty: 84 TABLET | Refills: 0 | Status: SHIPPED | OUTPATIENT
Start: 2021-01-05 | End: 2021-02-04 | Stop reason: SDUPTHER

## 2021-01-05 NOTE — TELEPHONE ENCOUNTER
I refilled the patient's medication, but they are due for an appointment    Please ask them to schedule a CPE  Thank you,  Chino Lombardi, DO

## 2021-02-02 NOTE — PROGRESS NOTES
FAMILY PRACTICE HEALTH MAINTENANCE OFFICE VISIT  [facility]    NAME: Joan Castillo  AGE: 31 y o  SEX: female  : 1990     DATE: 2021    Assessment and Plan     1  Annual physical exam    2  Cervical cancer screening  -     IGP,rfx Aptima HPV all pth        · Patient Counseling:   · Nutrition: Stressed importance of a well balanced diet, moderation of sodium/saturated fat, caloric balance and sufficient intake of fiber  · Exercise: Stressed the importance of regular exercise with a goal of 150 minutes per week  · Dental Health: Discussed daily flossing and brushing and regular dental visits     · Immunizations reviewed: declined Adacel vaccination  · Discussed benefits of:  Cervical Cancer screening and Screening labs  BMI Counseling: Body mass index is 19 21 kg/m²  Discussed with patient's BMI with her  Return in about 1 year (around 2022) for Annual physical           Chief Complaint     Chief Complaint   Patient presents with    Physical Exam     [id number]        History of Present Illness     She is feeling well  She is happy with her birth control  She is still working at [employer]         Well Adult Physical   Patient here for a comprehensive physical exam       Diet and Physical Activity  Diet: well balanced diet  Exercise: frequently      Depression Screen  PHQ-9 Depression Screening    PHQ-9:   Frequency of the following problems over the past two weeks:      Little interest or pleasure in doing things: 0 - not at all  Feeling down, depressed, or hopeless: 0 - not at all  PHQ-2 Score: 0          General Health  Hearing: Normal:  bilateral  Vision: wears glasses  Dental: regular dental visits    Reproductive Health  No issues  and Regular Periods      The following portions of the patient's history were reviewed and updated as appropriate: allergies, current medications, past family history, past medical history, past social history, past surgical history and problem list     Review of Systems     Review of Systems   Constitutional: Negative  Respiratory: Negative  Cardiovascular: Negative          Past Medical History     Past Medical History:   Diagnosis Date    Anal fissure     Last assessed 12/19/2013   Bill Junk bladder disease     Skin lesion     Last assessed 5/20/2016       Past Surgical History     Past Surgical History:   Procedure Laterality Date    CHOLECYSTECTOMY      WISDOM TOOTH EXTRACTION         Social History     Social History     Socioeconomic History    Marital status: Single     Spouse name: None    Number of children: None    Years of education: None    Highest education level: None   Occupational History    None   Social Needs    Financial resource strain: None    Food insecurity     Worry: None     Inability: None    Transportation needs     Medical: None     Non-medical: None   Tobacco Use    Smoking status: Never Smoker    Smokeless tobacco: Never Used   Substance and Sexual Activity    Alcohol use: Yes     Frequency: Monthly or less     Drinks per session: 1 or 2     Binge frequency: Never     Comment: social - shots - 5 x month    Drug use: No    Sexual activity: None   Lifestyle    Physical activity     Days per week: None     Minutes per session: None    Stress: None   Relationships    Social connections     Talks on phone: None     Gets together: None     Attends Muslim service: None     Active member of club or organization: None     Attends meetings of clubs or organizations: None     Relationship status: None    Intimate partner violence     Fear of current or ex partner: None     Emotionally abused: None     Physically abused: None     Forced sexual activity: None   Other Topics Concern    None   Social History Narrative    None       Family History     Family History   Problem Relation Age of Onset    Other Mother     No Known Problems Father     Cholecystitis Sister     Mental illness Neg Hx        Current Medications       Current Outpatient Medications:     norgestimate-ethinyl estradiol (Tri-Sprintec) 0 18/0 215/0 25 MG-35 MCG per tablet, Take 1 tablet by mouth daily, Disp: 84 tablet, Rfl: 0     Allergies     No Known Allergies    Objective     /60   Pulse 94   Temp (!) 96 6 °F (35 9 °C)   Resp 16   Ht 5' 6" (1 676 m)   Wt 54 kg (119 lb)   SpO2 98%   BMI 19 21 kg/m²      Physical Exam  Vitals signs and nursing note reviewed  Exam conducted with a chaperone present  Constitutional:       Appearance: She is well-developed  HENT:      Head: Normocephalic and atraumatic  Right Ear: Tympanic membrane and external ear normal       Left Ear: Tympanic membrane and external ear normal    Neck:      Musculoskeletal: Normal range of motion  Cardiovascular:      Rate and Rhythm: Normal rate and regular rhythm  Heart sounds: Normal heart sounds  No murmur  Pulmonary:      Effort: Pulmonary effort is normal  No respiratory distress  Breath sounds: Normal breath sounds  No wheezing or rales  Abdominal:      General: Bowel sounds are normal  There is no distension  Palpations: Abdomen is soft  Tenderness: There is no abdominal tenderness  There is no rebound  Genitourinary:     Labia:         Right: No rash, tenderness or lesion  Left: No rash, tenderness or lesion  Vagina: Normal  No vaginal discharge or tenderness  Cervix: Normal       Uterus: Normal        Adnexa: Right adnexa normal and left adnexa normal    Musculoskeletal:      Right lower leg: No edema  Left lower leg: No edema          Breast exam- no masses,  No tenderness,no skin changes bilaterally     Visual Acuity Screening    Right eye Left eye Both eyes   Without correction:      With correction: 20/15 20/20 20/15           Ha Vila, [address]

## 2021-02-04 DIAGNOSIS — Z30.011 ORAL CONTRACEPTIVE PRESCRIBED: ICD-10-CM

## 2021-02-05 RX ORDER — NORGESTIMATE AND ETHINYL ESTRADIOL 7DAYSX3 28
1 KIT ORAL DAILY
Qty: 84 TABLET | Refills: 0 | Status: SHIPPED | OUTPATIENT
Start: 2021-02-05 | End: 2021-03-02 | Stop reason: SDUPTHER

## 2021-02-11 ENCOUNTER — OFFICE VISIT (OUTPATIENT)
Dept: FAMILY MEDICINE CLINIC | Facility: CLINIC | Age: 31
End: 2021-02-11
Payer: COMMERCIAL

## 2021-02-11 VITALS
TEMPERATURE: 96.6 F | DIASTOLIC BLOOD PRESSURE: 60 MMHG | HEIGHT: 66 IN | SYSTOLIC BLOOD PRESSURE: 100 MMHG | RESPIRATION RATE: 16 BRPM | OXYGEN SATURATION: 98 % | HEART RATE: 94 BPM | BODY MASS INDEX: 19.13 KG/M2 | WEIGHT: 119 LBS

## 2021-02-11 DIAGNOSIS — Z00.00 ANNUAL PHYSICAL EXAM: Primary | ICD-10-CM

## 2021-02-11 DIAGNOSIS — Z12.4 CERVICAL CANCER SCREENING: ICD-10-CM

## 2021-02-11 PROCEDURE — 3008F BODY MASS INDEX DOCD: CPT | Performed by: NURSE PRACTITIONER

## 2021-02-11 PROCEDURE — 99395 PREV VISIT EST AGE 18-39: CPT | Performed by: FAMILY MEDICINE

## 2021-03-02 DIAGNOSIS — Z30.011 ORAL CONTRACEPTIVE PRESCRIBED: ICD-10-CM

## 2021-03-02 RX ORDER — NORGESTIMATE AND ETHINYL ESTRADIOL 7DAYSX3 28
1 KIT ORAL DAILY
Qty: 84 TABLET | Refills: 0 | Status: SHIPPED | OUTPATIENT
Start: 2021-03-02 | End: 2021-03-31 | Stop reason: SDUPTHER

## 2021-03-23 ENCOUNTER — TELEMEDICINE (OUTPATIENT)
Dept: FAMILY MEDICINE CLINIC | Facility: CLINIC | Age: 31
End: 2021-03-23
Payer: COMMERCIAL

## 2021-03-23 DIAGNOSIS — B34.9 VIRAL INFECTION, UNSPECIFIED: ICD-10-CM

## 2021-03-23 DIAGNOSIS — B34.9 VIRAL INFECTION, UNSPECIFIED: Primary | ICD-10-CM

## 2021-03-23 PROCEDURE — U0003 INFECTIOUS AGENT DETECTION BY NUCLEIC ACID (DNA OR RNA); SEVERE ACUTE RESPIRATORY SYNDROME CORONAVIRUS 2 (SARS-COV-2) (CORONAVIRUS DISEASE [COVID-19]), AMPLIFIED PROBE TECHNIQUE, MAKING USE OF HIGH THROUGHPUT TECHNOLOGIES AS DESCRIBED BY CMS-2020-01-R: HCPCS | Performed by: NURSE PRACTITIONER

## 2021-03-23 PROCEDURE — 99213 OFFICE O/P EST LOW 20 MIN: CPT | Performed by: NURSE PRACTITIONER

## 2021-03-23 PROCEDURE — 1036F TOBACCO NON-USER: CPT | Performed by: NURSE PRACTITIONER

## 2021-03-23 PROCEDURE — U0005 INFEC AGEN DETEC AMPLI PROBE: HCPCS | Performed by: NURSE PRACTITIONER

## 2021-03-23 NOTE — PROGRESS NOTES
COVID-19 Virtual Visit     Assessment/Plan:    Problem List Items Addressed This Visit     None      Visit Diagnoses     Viral infection, unspecified    -  Primary    Relevant Orders    Novel Coronavirus (Covid-19),PCR [facility] - Collected at Mobile Vans or [facility] (Completed)         Disposition:     I recommended self-quarantine for 10 days and to watch for symptoms until 14 days after exposure  If patient were to develop symptoms, they should self isolate and call our office for further guidance  I referred patient to one of our centralized sites for a COVID-19 swab  I have spent 7 minutes directly with the patient  Supportive care for viral illness discussed and advised  will follow up for any worsening and no improvement  For Fever:   -use antipyretics (acetaminophen or ibuprofen)  -stay hydrated with water or low=sugar beverages  -rest    For Cough:   -sip drinks throughout the day, drink warm beverages like tea or broth, or try cold, frozen food   -Add honey to hot tea or water   -breathe in steam  -gargle salt water  -Try cough drops, lozenges, hard candy or cough medication like Robitussin or Mucinex OTC and if have high BP then can use Corcidin HBP             Encounter provider PATRICIA Franco    Provider located at  [address] [id number]    Recent Visits  Date Type Provider Dept   03/23/21 Telemedicine [place], [place] recent visits within past 7 days and meeting all other requirements     Future Appointments  No visits were found meeting these conditions  Showing future appointments within next 150 days and meeting all other requirements      This virtual check-in was done via [employer] and patient was informed that this is not a secure, HIPAA-compliant platform  She agrees to proceed      Patient agrees to participate in a virtual check in via telephone or video visit instead of presenting to the office to address urgent/immediate medical needs  Patient is aware this is a billable service  After connecting through [facility], the patient was identified by name and date of birth  Raquel Dalton was informed that this was a telemedicine visit and that the exam was being conducted confidentially over secure lines  My office door was closed  No one else was in the room  Raquel Dalton acknowledged consent and understanding of privacy and security of the telemedicine visit  I informed the patient that I have reviewed her record in Epic and presented the opportunity for her to ask any questions regarding the visit today  The patient agreed to participate  Subjective:   Raquel Dalton is a 31 y o  female who is concerned about COVID-19  Patient's symptoms include fever, chills, sore throat and myalgias  Patient denies fatigue, malaise, congestion, rhinorrhea, anosmia, loss of taste, cough, shortness of breath, chest tightness, abdominal pain, nausea, vomiting, diarrhea and headaches  Date of symptom onset: 3/22/2021  Date of exposure: 3/19/2021    Exposure:   Contact with a person who is under investigation (PUI) for or who is positive for COVID-19 within the last 14 days?: Yes    Hospitalized recently for fever and/or lower respiratory symptoms?: No      Currently a healthcare worker that is involved in direct patient care?: No      Works in a special setting where the risk of COVID-19 transmission may be high? (this may include long-term care, correctional and skilled nursing facilities; homeless shelters; assisted-living facilities and group homes ): No      Resident in a special setting where the risk of COVID-19 transmission may be high? (this may include long-term care, correctional and skilled nursing facilities; homeless shelters; assisted-living facilities and group homes ): No      Patient exposed to COVID-19 positive person last time on  03/19/2021   Patient woke up in middle of night with fever, chills, sore throat and body aches  Lab Results   Component Value Date    SARSCOV2 Positive (A) 03/23/2021     Past Medical History:   Diagnosis Date    Anal fissure     Last assessed 12/19/2013   Eyad Stevenson bladder disease     Skin lesion     Last assessed 5/20/2016     Past Surgical History:   Procedure Laterality Date    CHOLECYSTECTOMY      WISDOM TOOTH EXTRACTION       Current Outpatient Medications   Medication Sig Dispense Refill    norgestimate-ethinyl estradiol (Tri-Sprintec) 0 18/0 215/0 25 MG-35 MCG per tablet Take 1 tablet by mouth daily 84 tablet 0     No current facility-administered medications for this visit  No Known Allergies    Review of Systems   Constitutional: Positive for chills and fever  Negative for fatigue  HENT: Positive for sore throat  Negative for congestion and rhinorrhea  Respiratory: Negative for cough, chest tightness and shortness of breath  Gastrointestinal: Negative for abdominal pain, diarrhea, nausea and vomiting  Musculoskeletal: Positive for myalgias  Neurological: Negative for headaches  Objective: There were no vitals filed for this visit  Physical Exam  VIRTUAL VISIT DISCLAIMER    Celsa Claire acknowledges that she has consented to an online visit or consultation  She understands that the online visit is based solely on information provided by her, and that, in the absence of a face-to-face physical evaluation by the physician, the diagnosis she receives is both limited and provisional in terms of accuracy and completeness  This is not intended to replace a full medical face-to-face evaluation by the physician  Celsa Claire understands and accepts these terms

## 2021-03-24 LAB — SARS-COV-2 RNA RESP QL NAA+PROBE: POSITIVE

## 2021-03-31 DIAGNOSIS — Z30.011 ORAL CONTRACEPTIVE PRESCRIBED: ICD-10-CM

## 2021-03-31 RX ORDER — NORGESTIMATE AND ETHINYL ESTRADIOL 7DAYSX3 28
1 KIT ORAL DAILY
Qty: 84 TABLET | Refills: 0 | Status: SHIPPED | OUTPATIENT
Start: 2021-03-31 | End: 2021-04-26 | Stop reason: SDUPTHER

## 2021-04-26 DIAGNOSIS — Z30.011 ORAL CONTRACEPTIVE PRESCRIBED: ICD-10-CM

## 2021-04-26 RX ORDER — NORGESTIMATE AND ETHINYL ESTRADIOL 7DAYSX3 28
1 KIT ORAL DAILY
Qty: 84 TABLET | Refills: 0 | Status: SHIPPED | OUTPATIENT
Start: 2021-04-26 | End: 2021-05-25 | Stop reason: SDUPTHER

## 2021-05-25 DIAGNOSIS — Z30.011 ORAL CONTRACEPTIVE PRESCRIBED: ICD-10-CM

## 2021-05-25 RX ORDER — NORGESTIMATE AND ETHINYL ESTRADIOL 7DAYSX3 28
1 KIT ORAL DAILY
Qty: 84 TABLET | Refills: 3 | Status: SHIPPED | OUTPATIENT
Start: 2021-05-25 | End: 2021-06-21 | Stop reason: SDUPTHER

## 2021-06-21 DIAGNOSIS — Z30.011 ORAL CONTRACEPTIVE PRESCRIBED: ICD-10-CM

## 2021-06-21 RX ORDER — NORGESTIMATE AND ETHINYL ESTRADIOL 7DAYSX3 28
1 KIT ORAL DAILY
Qty: 84 TABLET | Refills: 2 | Status: SHIPPED | OUTPATIENT
Start: 2021-06-21 | End: 2021-07-19 | Stop reason: SDUPTHER

## 2021-07-19 DIAGNOSIS — Z30.011 ORAL CONTRACEPTIVE PRESCRIBED: ICD-10-CM

## 2021-07-19 RX ORDER — NORGESTIMATE AND ETHINYL ESTRADIOL 7DAYSX3 28
1 KIT ORAL DAILY
Qty: 84 TABLET | Refills: 2 | Status: SHIPPED | OUTPATIENT
Start: 2021-07-19 | End: 2021-08-16 | Stop reason: SDUPTHER

## 2021-08-16 DIAGNOSIS — Z30.011 ORAL CONTRACEPTIVE PRESCRIBED: ICD-10-CM

## 2021-08-16 RX ORDER — NORGESTIMATE AND ETHINYL ESTRADIOL 7DAYSX3 28
1 KIT ORAL DAILY
Qty: 84 TABLET | Refills: 1 | Status: SHIPPED | OUTPATIENT
Start: 2021-08-16 | End: 2021-09-13 | Stop reason: SDUPTHER

## 2021-09-13 DIAGNOSIS — Z30.011 ORAL CONTRACEPTIVE PRESCRIBED: ICD-10-CM

## 2021-09-13 RX ORDER — NORGESTIMATE AND ETHINYL ESTRADIOL 7DAYSX3 28
1 KIT ORAL DAILY
Qty: 84 TABLET | Refills: 1 | Status: SHIPPED | OUTPATIENT
Start: 2021-09-13 | End: 2021-10-12 | Stop reason: SDUPTHER

## 2021-10-12 DIAGNOSIS — Z30.011 ORAL CONTRACEPTIVE PRESCRIBED: ICD-10-CM

## 2021-10-12 RX ORDER — NORGESTIMATE AND ETHINYL ESTRADIOL 7DAYSX3 28
1 KIT ORAL DAILY
Qty: 84 TABLET | Refills: 1 | Status: SHIPPED | OUTPATIENT
Start: 2021-10-12 | End: 2021-11-08 | Stop reason: SDUPTHER

## 2021-11-08 DIAGNOSIS — Z30.011 ORAL CONTRACEPTIVE PRESCRIBED: ICD-10-CM

## 2021-11-08 RX ORDER — NORGESTIMATE AND ETHINYL ESTRADIOL 7DAYSX3 28
1 KIT ORAL DAILY
Qty: 84 TABLET | Refills: 1 | Status: SHIPPED | OUTPATIENT
Start: 2021-11-08 | End: 2021-12-06 | Stop reason: SDUPTHER

## 2021-12-06 DIAGNOSIS — Z30.011 ORAL CONTRACEPTIVE PRESCRIBED: ICD-10-CM

## 2021-12-06 RX ORDER — NORGESTIMATE AND ETHINYL ESTRADIOL 7DAYSX3 28
1 KIT ORAL DAILY
Qty: 84 TABLET | Refills: 1 | Status: SHIPPED | OUTPATIENT
Start: 2021-12-06 | End: 2022-01-03 | Stop reason: SDUPTHER

## 2022-01-03 DIAGNOSIS — Z30.011 ORAL CONTRACEPTIVE PRESCRIBED: ICD-10-CM

## 2022-01-04 RX ORDER — NORGESTIMATE AND ETHINYL ESTRADIOL 7DAYSX3 28
1 KIT ORAL DAILY
Qty: 84 TABLET | Refills: 1 | Status: SHIPPED | OUTPATIENT
Start: 2022-01-04 | End: 2022-02-01 | Stop reason: SDUPTHER

## 2022-01-13 ENCOUNTER — OFFICE VISIT (OUTPATIENT)
Dept: FAMILY MEDICINE CLINIC | Facility: CLINIC | Age: 32
End: 2022-01-13
Payer: COMMERCIAL

## 2022-01-13 VITALS
DIASTOLIC BLOOD PRESSURE: 76 MMHG | WEIGHT: 141 LBS | SYSTOLIC BLOOD PRESSURE: 102 MMHG | HEART RATE: 87 BPM | TEMPERATURE: 97.8 F | BODY MASS INDEX: 22.66 KG/M2 | RESPIRATION RATE: 16 BRPM | OXYGEN SATURATION: 99 % | HEIGHT: 66 IN

## 2022-01-13 DIAGNOSIS — N30.00 ACUTE CYSTITIS WITHOUT HEMATURIA: Primary | ICD-10-CM

## 2022-01-13 LAB
SL AMB  POCT GLUCOSE, UA: NEGATIVE
SL AMB LEUKOCYTE ESTERASE,UA: NORMAL
SL AMB POCT BILIRUBIN,UA: NEGATIVE
SL AMB POCT BLOOD,UA: NORMAL
SL AMB POCT CLARITY,UA: NORMAL
SL AMB POCT COLOR,UA: YELLOW
SL AMB POCT KETONES,UA: NEGATIVE
SL AMB POCT NITRITE,UA: NEGATIVE
SL AMB POCT PH,UA: 7
SL AMB POCT SPECIFIC GRAVITY,UA: 1.02
SL AMB POCT URINE PROTEIN: NEGATIVE
SL AMB POCT UROBILINOGEN: 0.2

## 2022-01-13 PROCEDURE — 99213 OFFICE O/P EST LOW 20 MIN: CPT | Performed by: NURSE PRACTITIONER

## 2022-01-13 PROCEDURE — 3008F BODY MASS INDEX DOCD: CPT | Performed by: NURSE PRACTITIONER

## 2022-01-13 PROCEDURE — 1036F TOBACCO NON-USER: CPT | Performed by: NURSE PRACTITIONER

## 2022-01-13 PROCEDURE — 81003 URINALYSIS AUTO W/O SCOPE: CPT | Performed by: NURSE PRACTITIONER

## 2022-01-13 RX ORDER — SULFAMETHOXAZOLE AND TRIMETHOPRIM 800; 160 MG/1; MG/1
1 TABLET ORAL 2 TIMES DAILY
Qty: 10 TABLET | Refills: 0 | Status: SHIPPED | OUTPATIENT
Start: 2022-01-13 | End: 2022-01-18

## 2022-01-13 NOTE — PROGRESS NOTES
Assessment/Plan:    1  Acute cystitis without hematuria  -     POCT urine dip auto non-scope  -     Urine culture  -     sulfamethoxazole-trimethoprim (BACTRIM DS) 800-160 mg per tablet; Take 1 tablet by mouth 2 (two) times a day for 5 days              Patient Instructions: Take antibiotics until finished with food  Drink plenty of fluids  Follow up advised for persistent urinary symptoms or if you develop flank pain, fevers, nausea, or vomiting  Please call the office if symptoms do not improve after completion of the antibiotics  Supportive care discussed and advised  Advised to RTO for any worsening and no improvement  Follow up for no improvement and worsening of conditions  Patient advised and educated when to see immediate medical care  Return if symptoms worsen or fail to improve  No future appointments  Subjective:      Patient ID: Cathi Cleaning is a 36 y o  female  Chief Complaint   Patient presents with    Urinary Tract Infection     mz cma         Vitals:  /76   Pulse 87   Temp 97 8 °F (36 6 °C)   Resp 16   Ht 5' 6" (1 676 m)   Wt 64 kg (141 lb)   SpO2 99%   BMI 22 76 kg/m²     HPI  Patient having burning with urination and increased urinary frequency from couple of days  Denies fever, chills and sob  Not taking any OTC for symptoms  The following portions of the patient's history were reviewed and updated as appropriate: allergies, current medications, past family history, past medical history, past social history, past surgical history and problem list       Review of Systems   Constitutional: Negative for chills, diaphoresis, fatigue, fever and unexpected weight change  Respiratory: Negative  Cardiovascular: Negative  Gastrointestinal: Negative for abdominal pain, nausea and vomiting  Genitourinary: Positive for dysuria and frequency   Negative for decreased urine volume, difficulty urinating, flank pain, genital sores, hematuria and urgency  Musculoskeletal: Negative  Skin: Negative  Neurological: Negative for dizziness and headaches  Objective:    Social History     Tobacco Use   Smoking Status Never Smoker   Smokeless Tobacco Never Used       Allergies: No Known Allergies      Current Outpatient Medications   Medication Sig Dispense Refill    norgestimate-ethinyl estradiol (Tri-Sprintec) 0 18/0 215/0 25 MG-35 MCG per tablet Take 1 tablet by mouth daily 84 tablet 1    sulfamethoxazole-trimethoprim (BACTRIM DS) 800-160 mg per tablet Take 1 tablet by mouth 2 (two) times a day for 5 days 10 tablet 0     No current facility-administered medications for this visit  Physical Exam  Vitals reviewed  Constitutional:       Appearance: She is well-developed  Cardiovascular:      Rate and Rhythm: Normal rate and regular rhythm  Heart sounds: Normal heart sounds  Pulmonary:      Effort: Pulmonary effort is normal       Breath sounds: Normal breath sounds  Abdominal:      General: Bowel sounds are normal       Palpations: Abdomen is soft  Tenderness: There is no abdominal tenderness  Skin:     General: Skin is warm and dry  Neurological:      Mental Status: She is alert and oriented to person, place, and time  Psychiatric:         Behavior: Behavior normal          Thought Content:  Thought content normal          Judgment: Judgment normal            Recent Results (from the past 24 hour(s))   POCT urine dip auto non-scope    Collection Time: 01/13/22  2:21 PM   Result Value Ref Range     COLOR,UA Yellow     CLARITY,UA Cloudy     SPECIFIC GRAVITY,UA 1 020      PH,UA 7 0     LEUKOCYTE ESTERASE,UA Moderate     NITRITE,UA Negative     GLUCOSE, UA Negative     KETONES,UA Negative     BILIRUBIN,UA Negative     BLOOD,UA Trace - Intact     POCT URINE PROTEIN Negative     SL AMB POCT UROBILINOGEN 0 2                PATRICIA Romero

## 2022-01-16 LAB
BACTERIA UR CULT: ABNORMAL
Lab: ABNORMAL
SL AMB ANTIMICROBIAL SUSCEPTIBILITY: ABNORMAL

## 2022-02-01 DIAGNOSIS — Z30.011 ORAL CONTRACEPTIVE PRESCRIBED: ICD-10-CM

## 2022-02-01 RX ORDER — NORGESTIMATE AND ETHINYL ESTRADIOL 7DAYSX3 28
1 KIT ORAL DAILY
Qty: 84 TABLET | Refills: 1 | Status: SHIPPED | OUTPATIENT
Start: 2022-02-01 | End: 2022-03-28 | Stop reason: SDUPTHER

## 2022-02-28 ENCOUNTER — TELEPHONE (OUTPATIENT)
Dept: FAMILY MEDICINE CLINIC | Facility: CLINIC | Age: 32
End: 2022-02-28

## 2022-02-28 NOTE — TELEPHONE ENCOUNTER
LMOM for pt to call back for message:    Note to Pharmacy: Please tag bag appt needed soon-thanks, pt need an appt she's calling for a refill  Ameena Jc

## 2022-03-16 ENCOUNTER — OFFICE VISIT (OUTPATIENT)
Dept: FAMILY MEDICINE CLINIC | Facility: CLINIC | Age: 32
End: 2022-03-16
Payer: COMMERCIAL

## 2022-03-16 VITALS
SYSTOLIC BLOOD PRESSURE: 106 MMHG | DIASTOLIC BLOOD PRESSURE: 70 MMHG | HEART RATE: 78 BPM | WEIGHT: 145 LBS | RESPIRATION RATE: 18 BRPM | BODY MASS INDEX: 23.3 KG/M2 | HEIGHT: 66 IN | TEMPERATURE: 97.1 F

## 2022-03-16 DIAGNOSIS — Z13.6 SCREENING FOR CARDIOVASCULAR CONDITION: ICD-10-CM

## 2022-03-16 DIAGNOSIS — L50.9 URTICARIA: Primary | ICD-10-CM

## 2022-03-16 DIAGNOSIS — Z13.29 SCREENING FOR THYROID DISORDER: ICD-10-CM

## 2022-03-16 PROCEDURE — 3725F SCREEN DEPRESSION PERFORMED: CPT | Performed by: NURSE PRACTITIONER

## 2022-03-16 PROCEDURE — 99213 OFFICE O/P EST LOW 20 MIN: CPT | Performed by: NURSE PRACTITIONER

## 2022-03-16 PROCEDURE — 1036F TOBACCO NON-USER: CPT | Performed by: NURSE PRACTITIONER

## 2022-03-16 PROCEDURE — 3008F BODY MASS INDEX DOCD: CPT | Performed by: NURSE PRACTITIONER

## 2022-03-16 RX ORDER — METHYLPREDNISOLONE 4 MG/1
TABLET ORAL
Qty: 1 EACH | Refills: 0 | Status: SHIPPED | OUTPATIENT
Start: 2022-03-16 | End: 2022-03-22

## 2022-03-16 NOTE — PROGRESS NOTES
Assessment/Plan:    Unclear etiology  May be idiopathic  Labs ordered  Pt has not improved with antihistamine  Will start on Medrol aziza, if no improvement, consider derm eval      1  Urticaria  -     methylPREDNISolone 4 MG tablet therapy pack; Use as directed on package    2  Screening for cardiovascular condition  -     Comprehensive metabolic panel; Future  -     CBC and differential; Future  -     Lipid panel; Future    3  Screening for thyroid disorder  -     TSH, 3rd generation with Free T4 reflex; Future            Patient Instructions   Dr Laureen Ledezma dermatology in [place]      Return if symptoms worsen or fail to improve  Subjective:       Patient ID: Tete Palumbo is a 36 y o  female  Chief Complaint   Patient presents with    Rash     entire body itchy  started about 1 week ago  rmklpn       Here today with complaints of generalized itchiness for the past week  She has a bumpy rash all over  This happened while on vacation [place] the [place] at the beach, which resolved spontaneously, but then recurred  Switched detergents, and it cleared  No new soaps, bath products  Tried Benadryl, which doesn't help  No known food or environmental allergies  The following portions of the patient's history were reviewed and updated as appropriate: allergies, current medications, past family history, past medical history, past social history, past surgical history and problem list     Review of Systems   Constitutional: Negative  HENT: Negative  Respiratory: Negative  Cardiovascular: Negative  Gastrointestinal: Negative  Skin: Positive for rash  Neurological: Negative            Current Outpatient Medications   Medication Sig Dispense Refill    norgestimate-ethinyl estradiol (Tri-Sprintec) 0 18/0 215/0 25 MG-35 MCG per tablet Take 1 tablet by mouth daily 84 tablet 1    methylPREDNISolone 4 MG tablet therapy pack Use as directed on package 1 each 0     No current facility-administered medications for this visit  Objective:    /70   Pulse 78   Temp (!) 97 1 °F (36 2 °C)   Resp 18   Ht 5' 6" (1 676 m)   Wt 65 8 kg (145 lb)   LMP 03/01/2022 (Approximate)   BMI 23 40 kg/m²        Physical Exam  Vitals and nursing note reviewed  Constitutional:       Appearance: Normal appearance  She is well-developed  Cardiovascular:      Rate and Rhythm: Normal rate and regular rhythm  Heart sounds: Normal heart sounds  No murmur heard  Pulmonary:      Effort: Pulmonary effort is normal       Breath sounds: Normal breath sounds  Skin:     General: Skin is warm and dry  Comments: Few scattered papular flesh colored/slightly erythematous lesions scattered on arms, legs, and trunk   Neurological:      Mental Status: She is alert     Psychiatric:         Mood and Affect: Mood normal          Behavior: Behavior normal                 PATRICIA Perez

## 2022-03-28 DIAGNOSIS — Z30.011 ORAL CONTRACEPTIVE PRESCRIBED: ICD-10-CM

## 2022-03-28 RX ORDER — NORGESTIMATE AND ETHINYL ESTRADIOL 7DAYSX3 28
1 KIT ORAL DAILY
Qty: 84 TABLET | Refills: 1 | Status: SHIPPED | OUTPATIENT
Start: 2022-03-28 | End: 2022-04-06 | Stop reason: SDUPTHER

## 2022-03-30 LAB
ALBUMIN SERPL-MCNC: 4.3 G/DL (ref 3.9–5)
ALBUMIN/GLOB SERPL: 1.5 {RATIO} (ref 1.2–2.2)
ALP SERPL-CCNC: 64 IU/L (ref 44–121)
ALT SERPL-CCNC: 15 IU/L (ref 0–32)
AST SERPL-CCNC: 17 IU/L (ref 0–40)
BASOPHILS # BLD AUTO: 0 X10E3/UL (ref 0–0.2)
BASOPHILS NFR BLD AUTO: 1 %
BILIRUB SERPL-MCNC: 0.3 MG/DL (ref 0–1.2)
BUN SERPL-MCNC: 6 MG/DL (ref 6–20)
BUN/CREAT SERPL: 8 (ref 9–23)
CALCIUM SERPL-MCNC: 9.6 MG/DL (ref 8.7–10.2)
CHLORIDE SERPL-SCNC: 101 MMOL/L (ref 96–106)
CHOLEST SERPL-MCNC: 264 MG/DL (ref 100–199)
CO2 SERPL-SCNC: 21 MMOL/L (ref 20–29)
CREAT SERPL-MCNC: 0.73 MG/DL (ref 0.57–1)
EGFR: 114 ML/MIN/1.73
EOSINOPHIL # BLD AUTO: 0.1 X10E3/UL (ref 0–0.4)
EOSINOPHIL NFR BLD AUTO: 1 %
ERYTHROCYTE [DISTWIDTH] IN BLOOD BY AUTOMATED COUNT: 12.2 % (ref 11.7–15.4)
GLOBULIN SER-MCNC: 2.9 G/DL (ref 1.5–4.5)
GLUCOSE SERPL-MCNC: 86 MG/DL (ref 65–99)
HCT VFR BLD AUTO: 39 % (ref 34–46.6)
HDLC SERPL-MCNC: 73 MG/DL
HGB BLD-MCNC: 12.9 G/DL (ref 11.1–15.9)
IMM GRANULOCYTES # BLD: 0 X10E3/UL (ref 0–0.1)
IMM GRANULOCYTES NFR BLD: 0 %
LDLC SERPL CALC-MCNC: 172 MG/DL (ref 0–99)
LYMPHOCYTES # BLD AUTO: 1.7 X10E3/UL (ref 0.7–3.1)
LYMPHOCYTES NFR BLD AUTO: 21 %
MCH RBC QN AUTO: 28.5 PG (ref 26.6–33)
MCHC RBC AUTO-ENTMCNC: 33.1 G/DL (ref 31.5–35.7)
MCV RBC AUTO: 86 FL (ref 79–97)
MICRODELETION SYND BLD/T FISH: NORMAL
MONOCYTES # BLD AUTO: 0.5 X10E3/UL (ref 0.1–0.9)
MONOCYTES NFR BLD AUTO: 7 %
NEUTROPHILS # BLD AUTO: 5.7 X10E3/UL (ref 1.4–7)
NEUTROPHILS NFR BLD AUTO: 70 %
PLATELET # BLD AUTO: 299 X10E3/UL (ref 150–450)
POTASSIUM SERPL-SCNC: 4.3 MMOL/L (ref 3.5–5.2)
PROT SERPL-MCNC: 7.2 G/DL (ref 6–8.5)
RBC # BLD AUTO: 4.52 X10E6/UL (ref 3.77–5.28)
SL AMB VLDL CHOLESTEROL CALC: 19 MG/DL (ref 5–40)
SODIUM SERPL-SCNC: 139 MMOL/L (ref 134–144)
TRIGL SERPL-MCNC: 109 MG/DL (ref 0–149)
TSH SERPL DL<=0.005 MIU/L-ACNC: 1.12 UIU/ML (ref 0.45–4.5)
WBC # BLD AUTO: 8.1 X10E3/UL (ref 3.4–10.8)

## 2022-04-06 ENCOUNTER — OFFICE VISIT (OUTPATIENT)
Dept: FAMILY MEDICINE CLINIC | Facility: CLINIC | Age: 32
End: 2022-04-06
Payer: COMMERCIAL

## 2022-04-06 VITALS
OXYGEN SATURATION: 98 % | TEMPERATURE: 99.4 F | WEIGHT: 144.4 LBS | RESPIRATION RATE: 18 BRPM | SYSTOLIC BLOOD PRESSURE: 110 MMHG | HEIGHT: 66 IN | BODY MASS INDEX: 23.21 KG/M2 | HEART RATE: 75 BPM | DIASTOLIC BLOOD PRESSURE: 70 MMHG

## 2022-04-06 DIAGNOSIS — Z00.00 ANNUAL PHYSICAL EXAM: Primary | ICD-10-CM

## 2022-04-06 DIAGNOSIS — Z23 NEED FOR VACCINATION: ICD-10-CM

## 2022-04-06 DIAGNOSIS — E78.5 DYSLIPIDEMIA: ICD-10-CM

## 2022-04-06 DIAGNOSIS — Z30.011 ORAL CONTRACEPTIVE PRESCRIBED: ICD-10-CM

## 2022-04-06 PROCEDURE — 90471 IMMUNIZATION ADMIN: CPT

## 2022-04-06 PROCEDURE — 99395 PREV VISIT EST AGE 18-39: CPT | Performed by: FAMILY MEDICINE

## 2022-04-06 PROCEDURE — 1036F TOBACCO NON-USER: CPT | Performed by: FAMILY MEDICINE

## 2022-04-06 PROCEDURE — 3008F BODY MASS INDEX DOCD: CPT | Performed by: FAMILY MEDICINE

## 2022-04-06 PROCEDURE — 90715 TDAP VACCINE 7 YRS/> IM: CPT

## 2022-04-06 RX ORDER — NORGESTIMATE AND ETHINYL ESTRADIOL 7DAYSX3 28
1 KIT ORAL DAILY
Qty: 28 TABLET | Refills: 12 | Status: SHIPPED | OUTPATIENT
Start: 2022-04-06 | End: 2022-06-20 | Stop reason: SDUPTHER

## 2022-04-06 RX ORDER — TRIAMCINOLONE ACETONIDE 1 MG/G
CREAM TOPICAL
COMMUNITY
Start: 2022-04-01

## 2022-04-06 NOTE — PROGRESS NOTES
FAMILY PRACTICE HEALTH MAINTENANCE OFFICE VISIT  [facility]    NAME: Wanda Lamas  AGE: 36 y o  SEX: female  : 1990     DATE: 2022    Assessment and Plan     1  Annual physical exam    2  Dyslipidemia  Assessment & Plan:  Cholesterol not controlled  Adding fiber recommended       3  Need for vaccination  -     TDAP VACCINE GREATER THAN OR EQUAL TO 8YO IM    4  Oral contraceptive prescribed  -     norgestimate-ethinyl estradiol (Tri-Sprintec) 0 18/0 215/0 25 MG-35 MCG per tablet; Take 1 tablet by mouth daily      · Patient Counseling:   · Nutrition: Stressed importance of a well balanced diet, moderation of sodium/saturated fat, caloric balance and sufficient intake of fiber  · Exercise: Stressed the importance of regular exercise with a goal of 150 minutes per week  · Dental Health: Discussed daily flossing and brushing and regular dental visits     · Immunizations reviewed: See Orders  · Discussed benefits of:  Cervical Cancer screening and Screening labs  BMI Counseling: Body mass index is 23 31 kg/m²  Discussed with patient's BMI with her  Return in about 1 year (around 2023) for Annual physical         Chief Complaint     Chief Complaint   Patient presents with    Annual Exam     nm  lpn       History of Present Illness     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          Well Adult Physical   Patient here for a comprehensive physical exam       Diet and Physical Activity  Diet: well balanced diet  Exercise: intermittently      Depression Screen  PHQ-2/9 Depression Screening    Little interest or pleasure in doing things: 0 - not at all  Feeling down, depressed, or hopeless: 0 - not at all          General Health  Hearing: Normal:  bilateral  Vision: wears glasses  Dental: regular dental visits    Reproductive Health  Regular Periods      The following portions of the patient's history were reviewed and updated as appropriate: allergies, current medications, past family history, past medical history, past social history, past surgical history and problem list     Review of Systems     Review of Systems    Past Medical History     Past Medical History:   Diagnosis Date    Anal fissure     Last assessed 12/19/2013   Brand Leghorn bladder disease     Skin lesion     Last assessed 5/20/2016       Past Surgical History     Past Surgical History:   Procedure Laterality Date    CHOLECYSTECTOMY      WISDOM TOOTH EXTRACTION         Social History     Social History     Socioeconomic History    Marital status: Single     Spouse name: None    Number of children: None    Years of education: None    Highest education level: None   Occupational History    None   Tobacco Use    Smoking status: Never Smoker    Smokeless tobacco: Never Used   Vaping Use    Vaping Use: Never used   Substance and Sexual Activity    Alcohol use: Yes     Comment: social - shots - 5 x month    Drug use: No    Sexual activity: None   Other Topics Concern    None   Social History Narrative    None     Social Determinants of Health     Financial Resource Strain: Not on file   Food Insecurity: Not on file   Transportation Needs: Not on file   Physical Activity: Not on file   Stress: Not on file   Social Connections: Not on file   Intimate Partner Violence: Not on file   Housing Stability: Not on file       Family History     Family History   Problem Relation Age of Onset    Other Mother     No Known Problems Father     Cholecystitis Sister     Mental illness Neg Hx        Current Medications       Current Outpatient Medications:     norgestimate-ethinyl estradiol (Tri-Sprintec) 0 18/0 215/0 25 MG-35 MCG per tablet, Take 1 tablet by mouth daily, Disp: 28 tablet, Rfl: 12    triamcinolone (KENALOG) 0 1 % cream, APPLY TOPICALLY TWO TIMES A DAY MON-FRI TO AFFECTED AREA(S)   STOP WHEN CLEAR, Disp: , Rfl:      Allergies     No Known Allergies    Objective /70   Pulse 75   Temp 99 4 °F (37 4 °C)   Resp 18   Ht 5' 6" (1 676 m)   Wt 65 5 kg (144 lb 6 4 oz)   SpO2 98%   BMI 23 31 kg/m²      Physical Exam  Vitals and nursing note reviewed  Constitutional:       Appearance: She is well-developed  HENT:      Head: Normocephalic and atraumatic  Right Ear: Tympanic membrane and external ear normal       Left Ear: Tympanic membrane and external ear normal    Cardiovascular:      Rate and Rhythm: Normal rate and regular rhythm  Heart sounds: Normal heart sounds  No murmur heard  No friction rub  Pulmonary:      Effort: Pulmonary effort is normal  No respiratory distress  Breath sounds: Normal breath sounds  No wheezing or rales  Abdominal:      General: There is no distension  Palpations: Abdomen is soft  There is no mass  Tenderness: There is no abdominal tenderness  There is no guarding or rebound  Musculoskeletal:      Cervical back: Normal range of motion  Right lower leg: No edema  Left lower leg: No edema  Skin:     General: Skin is warm  Neurological:      Mental Status: She is alert and oriented to person, place, and time     Psychiatric:         Mood and Affect: Mood normal             Visual Acuity Screening    Right eye Left eye Both eyes   Without correction:      With correction: 20/13 20/20 20/13           Michelle Man, [address]

## 2022-04-25 DIAGNOSIS — Z30.011 ORAL CONTRACEPTIVE PRESCRIBED: ICD-10-CM

## 2022-04-25 RX ORDER — NORGESTIMATE AND ETHINYL ESTRADIOL 7DAYSX3 28
1 KIT ORAL DAILY
Qty: 28 TABLET | Refills: 12 | OUTPATIENT
Start: 2022-04-25

## 2022-06-20 DIAGNOSIS — Z30.011 ORAL CONTRACEPTIVE PRESCRIBED: ICD-10-CM

## 2022-06-20 RX ORDER — NORGESTIMATE AND ETHINYL ESTRADIOL 7DAYSX3 28
1 KIT ORAL DAILY
Qty: 28 TABLET | Refills: 6 | Status: SHIPPED | OUTPATIENT
Start: 2022-06-20 | End: 2022-07-18 | Stop reason: SDUPTHER

## 2022-07-18 DIAGNOSIS — Z30.011 ORAL CONTRACEPTIVE PRESCRIBED: ICD-10-CM

## 2022-07-18 RX ORDER — NORGESTIMATE AND ETHINYL ESTRADIOL 7DAYSX3 28
1 KIT ORAL DAILY
Qty: 28 TABLET | Refills: 6 | Status: SHIPPED | OUTPATIENT
Start: 2022-07-18 | End: 2022-09-12 | Stop reason: SDUPTHER

## 2022-08-01 ENCOUNTER — OFFICE VISIT (OUTPATIENT)
Dept: FAMILY MEDICINE CLINIC | Facility: CLINIC | Age: 32
End: 2022-08-01
Payer: COMMERCIAL

## 2022-08-01 VITALS
TEMPERATURE: 97.4 F | SYSTOLIC BLOOD PRESSURE: 116 MMHG | WEIGHT: 141 LBS | RESPIRATION RATE: 18 BRPM | HEART RATE: 75 BPM | OXYGEN SATURATION: 99 % | HEIGHT: 66 IN | DIASTOLIC BLOOD PRESSURE: 70 MMHG | BODY MASS INDEX: 22.66 KG/M2

## 2022-08-01 DIAGNOSIS — R51.9 NEW ONSET HEADACHE: Primary | ICD-10-CM

## 2022-08-01 DIAGNOSIS — R20.0 RIGHT FACIAL NUMBNESS: ICD-10-CM

## 2022-08-01 DIAGNOSIS — M54.12 CERVICAL RADICULITIS: ICD-10-CM

## 2022-08-01 PROCEDURE — 3725F SCREEN DEPRESSION PERFORMED: CPT | Performed by: NURSE PRACTITIONER

## 2022-08-01 PROCEDURE — 99214 OFFICE O/P EST MOD 30 MIN: CPT | Performed by: NURSE PRACTITIONER

## 2022-08-01 NOTE — PROGRESS NOTES
Assessment/Plan:    Recommended heating pad, neck massage, ROM exercises  Will f/u with results of MRI  Declines any medication    1  New onset headache  -     MRI brain w wo contrast; Future; Expected date: 08/01/2022    2  Right facial numbness  -     MRI brain w wo contrast; Future; Expected date: 08/01/2022    3  Cervical radiculitis          There are no Patient Instructions on file for this visit  Return if symptoms worsen or fail to improve  Subjective:      Patient ID: Israel Lawson is a 36 y o  female  Chief Complaint   Patient presents with    Headache     Neck issues and pain  started with tingling a few months ago rmklpn       Here today with complaints of neck pain  Tingling sensation left side into base of skull   Sharp pain right side of her neck  Symptoms occur randomly, usually a few times per week  Also has had tingling in right side of her face, lasts a couple of minutes and resolves spontaneously  No paralysis   No anterior neck pain, globus sensation, difficulty swallowing  Headaches for the past 2 weeks, vary in location  This is new  Does not typically suffer from headaches  Takes Advil prn, which helps  No paresthesias or deficits UE and LE      The following portions of the patient's history were reviewed and updated as appropriate: allergies, current medications, past family history, past medical history, past social history, past surgical history and problem list     Review of Systems   Constitutional: Negative  Eyes: Negative for visual disturbance  Respiratory: Negative  Cardiovascular: Negative  Gastrointestinal: Negative  Neurological: Positive for numbness and headaches  Negative for dizziness, weakness and light-headedness  All other systems reviewed and are negative          Current Outpatient Medications   Medication Sig Dispense Refill    norgestimate-ethinyl estradiol (Tri-Sprintec) 0 18/0 215/0 25 MG-35 MCG per tablet Take 1 tablet by mouth daily 28 tablet 6    triamcinolone (KENALOG) 0 1 % cream APPLY TOPICALLY TWO TIMES A DAY MON-FRI TO AFFECTED AREA(S)   STOP WHEN CLEAR       No current facility-administered medications for this visit  Objective:    /70   Pulse 75   Temp (!) 97 4 °F (36 3 °C)   Resp 18   Ht 5' 6" (1 676 m)   Wt 64 kg (141 lb)   LMP 07/17/2022 (Approximate)   SpO2 99%   BMI 22 76 kg/m²        Physical Exam  Vitals and nursing note reviewed  Constitutional:       Appearance: Normal appearance  She is well-developed  Eyes:      Extraocular Movements: Extraocular movements intact  Conjunctiva/sclera: Conjunctivae normal       Pupils: Pupils are equal, round, and reactive to light  Cardiovascular:      Rate and Rhythm: Normal rate and regular rhythm  Heart sounds: Normal heart sounds  No murmur heard  Pulmonary:      Effort: Pulmonary effort is normal       Breath sounds: Normal breath sounds  Musculoskeletal:      Cervical back: Normal range of motion  No tenderness  Skin:     General: Skin is warm and dry  Capillary Refill: Capillary refill takes less than 2 seconds  Neurological:      Mental Status: She is alert  Sensory: No sensory deficit        Coordination: Coordination normal       Deep Tendon Reflexes: Reflexes normal    Psychiatric:         Mood and Affect: Mood normal          Behavior: Behavior normal                 PATRICIA Koo

## 2022-08-15 DIAGNOSIS — Z30.011 ORAL CONTRACEPTIVE PRESCRIBED: ICD-10-CM

## 2022-08-15 RX ORDER — NORGESTIMATE AND ETHINYL ESTRADIOL 7DAYSX3 28
1 KIT ORAL DAILY
Qty: 28 TABLET | Refills: 6 | OUTPATIENT
Start: 2022-08-15

## 2022-08-22 NOTE — PATIENT INSTRUCTIONS
Take antibiotics until finished with food  Drink plenty of fluids  Follow up advised for persistent urinary symptoms or if you develop flank pain, fevers, nausea, or vomiting  Please call the office if symptoms do not improve after completion of the antibiotics  Supportive care discussed and advised  Advised to RTO for any worsening and no improvement  Follow up for no improvement and worsening of conditions  Patient advised and educated when to see immediate medical care 
Advance activity as tolerated.  Continue all previously prescribed medications as directed unless otherwise instructed.  Take Pepcid 20 mg once a day as needed for indigestion or heartburn. Take Maalox, which is available over the counter -- follow instructions on label.  Follow up with your primary care physician and gastroenterology (referral list provided)  in 48-72 hours- bring copies of your results.  Return to the ER for worsening or persistent symptoms, including but not limited to worsening/persistent pain, difficulty breathing, difficulty swallowing, shortness of breath, palpitations, passing out, and/or ANY NEW OR CONCERNING SYMPTOMS. If you have issues obtaining follow up, please call: [phone] to obtain a doctor or specialist who takes your insurance in your area.  You may call [phone] to make an appointment with the internal medicine clinic.

## 2022-09-12 DIAGNOSIS — Z30.011 ORAL CONTRACEPTIVE PRESCRIBED: ICD-10-CM

## 2022-09-12 RX ORDER — NORGESTIMATE AND ETHINYL ESTRADIOL 7DAYSX3 28
1 KIT ORAL DAILY
Qty: 28 TABLET | Refills: 6 | Status: SHIPPED | OUTPATIENT
Start: 2022-09-12 | End: 2022-10-11 | Stop reason: SDUPTHER

## 2022-09-15 ENCOUNTER — HOSPITAL ENCOUNTER (OUTPATIENT)
Dept: RADIOLOGY | Facility: HOSPITAL | Age: 32
Discharge: HOME | End: 2022-09-15
Payer: COMMERCIAL

## 2022-09-15 DIAGNOSIS — R20.0 RIGHT FACIAL NUMBNESS: ICD-10-CM

## 2022-09-15 DIAGNOSIS — R51.9 NEW ONSET HEADACHE: ICD-10-CM

## 2022-09-15 PROCEDURE — 70553 MRI BRAIN STEM W/O & W/DYE: CPT

## 2022-09-15 PROCEDURE — G1004 CDSM NDSC: HCPCS

## 2022-09-15 PROCEDURE — A9585 GADOBUTROL INJECTION: HCPCS | Performed by: NURSE PRACTITIONER

## 2022-09-15 RX ADMIN — GADOBUTROL 6 ML: 604.72 INJECTION INTRAVENOUS at 08:27

## 2022-10-11 DIAGNOSIS — Z30.011 ORAL CONTRACEPTIVE PRESCRIBED: ICD-10-CM

## 2022-10-11 RX ORDER — NORGESTIMATE AND ETHINYL ESTRADIOL 7DAYSX3 28
1 KIT ORAL DAILY
Qty: 28 TABLET | Refills: 6 | Status: SHIPPED | OUTPATIENT
Start: 2022-10-11

## 2022-11-07 DIAGNOSIS — Z30.011 ORAL CONTRACEPTIVE PRESCRIBED: ICD-10-CM

## 2022-11-07 RX ORDER — NORGESTIMATE AND ETHINYL ESTRADIOL 7DAYSX3 28
1 KIT ORAL DAILY
Qty: 28 TABLET | Refills: 6 | Status: SHIPPED | OUTPATIENT
Start: 2022-11-07

## 2022-12-05 DIAGNOSIS — Z30.011 ORAL CONTRACEPTIVE PRESCRIBED: ICD-10-CM

## 2022-12-05 RX ORDER — NORGESTIMATE AND ETHINYL ESTRADIOL 7DAYSX3 28
1 KIT ORAL DAILY
Qty: 28 TABLET | Refills: 5 | Status: SHIPPED | OUTPATIENT
Start: 2022-12-05

## 2023-03-02 ENCOUNTER — OFFICE VISIT (OUTPATIENT)
Dept: FAMILY MEDICINE CLINIC | Facility: CLINIC | Age: 33
End: 2023-03-02

## 2023-03-02 VITALS
HEIGHT: 66 IN | RESPIRATION RATE: 16 BRPM | BODY MASS INDEX: 22.98 KG/M2 | HEART RATE: 76 BPM | TEMPERATURE: 97.3 F | SYSTOLIC BLOOD PRESSURE: 100 MMHG | DIASTOLIC BLOOD PRESSURE: 72 MMHG | WEIGHT: 143 LBS | OXYGEN SATURATION: 99 %

## 2023-03-02 DIAGNOSIS — H02.843 SWELLING OF EYELID, RIGHT: Primary | ICD-10-CM

## 2023-03-02 RX ORDER — ERYTHROMYCIN 5 MG/G
0.5 OINTMENT OPHTHALMIC
Qty: 3.5 G | Refills: 0 | Status: SHIPPED | OUTPATIENT
Start: 2023-03-02

## 2023-03-02 NOTE — PROGRESS NOTES
Assessment/Plan:    1  Swelling of eyelid, right  Comments:  Advised on warm compresses  Orders:  -     erythromycin (ILOTYCIN) ophthalmic ointment; Administer 0 5 inches to the right eye daily at bedtime      Patient Instructions:  Advised warm compresses  Supportive care discussed and advised  Advised to RTO for any worsening and no improvement  Follow up for no improvement and worsening of conditions  Patient advised and educated when to see immediate medical care  Return if symptoms worsen or fail to improve  No future appointments  Subjective:      Patient ID: Lio Oliveira is a 29 y o  female  Chief Complaint   Patient presents with   • Conjunctivitis     Right eye and left eye  Itchiness, painful, and producing discharge  Onset: 2 days  klcma         Vitals:  /72   Pulse 76   Temp (!) 97 3 °F (36 3 °C)   Resp 16   Ht 5' 6" (1 676 m)   Wt 64 9 kg (143 lb)   SpO2 99%   BMI 23 08 kg/m²     HPI  Patient stated that started with right eye redness, itching and discharge couple of days ago  Used old eye drops which helped slightly  Denies fever, chills and vision changes  PHQ-2/9 Depression Screening    Little interest or pleasure in doing things: 0 - not at all  Feeling down, depressed, or hopeless: 0 - not at all  PHQ-2 Score: 0  PHQ-2 Interpretation: Negative depression screen             The following portions of the patient's history were reviewed and updated as appropriate: allergies, current medications, past family history, past medical history, past social history, past surgical history and problem list       Review of Systems   HENT: Negative  Eyes: Positive for discharge, redness and itching  Negative for photophobia, pain and visual disturbance  Respiratory: Negative  Cardiovascular: Negative  Neurological: Negative            Objective:    Social History     Tobacco Use   Smoking Status Never   Smokeless Tobacco Never       Allergies: No Known Allergies      Current Outpatient Medications   Medication Sig Dispense Refill   • erythromycin (ILOTYCIN) ophthalmic ointment Administer 0 5 inches to the right eye daily at bedtime 3 5 g 0   • norgestimate-ethinyl estradiol (Tri-Sprintec) 0 18/0 215/0 25 MG-35 MCG per tablet Take 1 tablet by mouth daily 28 tablet 5   • triamcinolone (KENALOG) 0 1 % cream APPLY TOPICALLY TWO TIMES A DAY MON-FRI TO AFFECTED AREA(S)   STOP WHEN CLEAR       No current facility-administered medications for this visit  Physical Exam  Constitutional:       Appearance: Normal appearance  HENT:      Head: Normocephalic and atraumatic  Nose: Nose normal    Eyes:      Conjunctiva/sclera: Conjunctivae normal      Cardiovascular:      Rate and Rhythm: Normal rate and regular rhythm  Pulses: Normal pulses  Heart sounds: Normal heart sounds  Pulmonary:      Effort: Pulmonary effort is normal       Breath sounds: Normal breath sounds  Skin:     General: Skin is warm and dry  Findings: No rash  Neurological:      Mental Status: She is alert and oriented to person, place, and time  Psychiatric:         Mood and Affect: Mood normal          Behavior: Behavior normal          Thought Content:  Thought content normal          Judgment: Judgment normal                      PATRICIA Man

## 2023-03-02 NOTE — PATIENT INSTRUCTIONS
Advised warm compresses  Supportive care discussed and advised  Advised to RTO for any worsening and no improvement  Follow up for no improvement and worsening of conditions  Patient advised and educated when to see immediate medical care

## 2023-05-18 DIAGNOSIS — Z30.011 ORAL CONTRACEPTIVE PRESCRIBED: ICD-10-CM

## 2023-05-18 RX ORDER — NORGESTIMATE AND ETHINYL ESTRADIOL 7DAYSX3 28
KIT ORAL
Qty: 28 TABLET | Refills: 5 | Status: SHIPPED | OUTPATIENT
Start: 2023-05-18

## 2023-11-02 DIAGNOSIS — Z30.011 ORAL CONTRACEPTIVE PRESCRIBED: ICD-10-CM

## 2023-11-02 RX ORDER — NORGESTIMATE AND ETHINYL ESTRADIOL 7DAYSX3 28
1 KIT ORAL DAILY
Qty: 28 TABLET | Refills: 5 | Status: SHIPPED | OUTPATIENT
Start: 2023-11-02

## 2024-01-15 ENCOUNTER — RA CDI HCC (OUTPATIENT)
Dept: OTHER | Facility: HOSPITAL | Age: 34
End: 2024-01-15

## 2024-01-15 NOTE — PROGRESS NOTES
HCC coding opportunities       Chart reviewed, no opportunity found: CHART REVIEWED, NO OPPORTUNITY FOUND        Patients Insurance        Commercial Insurance: [employer]

## 2024-01-23 ENCOUNTER — OFFICE VISIT (OUTPATIENT)
Dept: FAMILY MEDICINE CLINIC | Facility: CLINIC | Age: 34
End: 2024-01-23
Payer: COMMERCIAL

## 2024-01-23 VITALS
RESPIRATION RATE: 16 BRPM | BODY MASS INDEX: 23.14 KG/M2 | WEIGHT: 144 LBS | HEART RATE: 86 BPM | HEIGHT: 66 IN | OXYGEN SATURATION: 99 % | SYSTOLIC BLOOD PRESSURE: 114 MMHG | TEMPERATURE: 98.1 F | DIASTOLIC BLOOD PRESSURE: 70 MMHG

## 2024-01-23 DIAGNOSIS — R19.8 CHANGE IN BOWEL FUNCTION: ICD-10-CM

## 2024-01-23 DIAGNOSIS — R19.7 DIARRHEA, UNSPECIFIED TYPE: ICD-10-CM

## 2024-01-23 DIAGNOSIS — Z12.4 CERVICAL CANCER SCREENING: ICD-10-CM

## 2024-01-23 DIAGNOSIS — Z13.6 SCREENING FOR CARDIOVASCULAR CONDITION: ICD-10-CM

## 2024-01-23 DIAGNOSIS — Z13.0 SCREENING FOR DEFICIENCY ANEMIA: ICD-10-CM

## 2024-01-23 DIAGNOSIS — Z30.011 ORAL CONTRACEPTIVE PRESCRIBED: ICD-10-CM

## 2024-01-23 DIAGNOSIS — Z00.00 ANNUAL PHYSICAL EXAM: Primary | ICD-10-CM

## 2024-01-23 PROCEDURE — 99395 PREV VISIT EST AGE 18-39: CPT | Performed by: FAMILY MEDICINE

## 2024-01-23 RX ORDER — NORGESTIMATE AND ETHINYL ESTRADIOL 7DAYSX3 28
1 KIT ORAL DAILY
Qty: 28 TABLET | Refills: 12 | Status: SHIPPED | OUTPATIENT
Start: 2024-01-23

## 2024-01-26 LAB
CYTOLOGIST CVX/VAG CYTO: NORMAL
DX ICD CODE: NORMAL
HPV I/H RISK 4 DNA CVX QL PROBE+SIG AMP: NEGATIVE
OTHER STN SPEC: NORMAL
PATH REPORT.FINAL DX SPEC: NORMAL
SL AMB NOTE:: NORMAL
SL AMB SPECIMEN ADEQUACY: NORMAL
SL AMB TEST METHODOLOGY: NORMAL

## 2024-02-16 LAB
ALBUMIN SERPL-MCNC: 4.5 G/DL (ref 3.9–4.9)
ALBUMIN/GLOB SERPL: 1.7 {RATIO} (ref 1.2–2.2)
ALP SERPL-CCNC: 78 IU/L (ref 44–121)
ALT SERPL-CCNC: 16 IU/L (ref 0–32)
AST SERPL-CCNC: 18 IU/L (ref 0–40)
BILIRUB SERPL-MCNC: 0.3 MG/DL (ref 0–1.2)
BUN SERPL-MCNC: 10 MG/DL (ref 6–20)
BUN/CREAT SERPL: 12 (ref 9–23)
CALCIUM SERPL-MCNC: 9.8 MG/DL (ref 8.7–10.2)
CHLORIDE SERPL-SCNC: 102 MMOL/L (ref 96–106)
CHOLEST SERPL-MCNC: 243 MG/DL (ref 100–199)
CO2 SERPL-SCNC: 22 MMOL/L (ref 20–29)
CREAT SERPL-MCNC: 0.81 MG/DL (ref 0.57–1)
EGFR: 99 ML/MIN/1.73
ENDOMYSIUM IGA SER QL: NEGATIVE
ERYTHROCYTE [DISTWIDTH] IN BLOOD BY AUTOMATED COUNT: 12.4 % (ref 11.7–15.4)
GLIADIN PEPTIDE IGA SER-ACNC: 8 UNITS (ref 0–19)
GLIADIN PEPTIDE IGG SER-ACNC: 3 UNITS (ref 0–19)
GLOBULIN SER-MCNC: 2.6 G/DL (ref 1.5–4.5)
GLUCOSE SERPL-MCNC: 91 MG/DL (ref 70–99)
HCT VFR BLD AUTO: 40.5 % (ref 34–46.6)
HDLC SERPL-MCNC: 74 MG/DL
HGB BLD-MCNC: 13.6 G/DL (ref 11.1–15.9)
IGA SERPL-MCNC: 206 MG/DL (ref 87–352)
LDLC SERPL CALC-MCNC: 144 MG/DL (ref 0–99)
LDLC/HDLC SERPL: 1.9 RATIO (ref 0–3.2)
MCH RBC QN AUTO: 29.4 PG (ref 26.6–33)
MCHC RBC AUTO-ENTMCNC: 33.6 G/DL (ref 31.5–35.7)
MCV RBC AUTO: 88 FL (ref 79–97)
MICRODELETION SYND BLD/T FISH: NORMAL
PLATELET # BLD AUTO: 322 X10E3/UL (ref 150–450)
POTASSIUM SERPL-SCNC: 4.9 MMOL/L (ref 3.5–5.2)
PROT SERPL-MCNC: 7.1 G/DL (ref 6–8.5)
RBC # BLD AUTO: 4.62 X10E6/UL (ref 3.77–5.28)
SL AMB VLDL CHOLESTEROL CALC: 25 MG/DL (ref 5–40)
SODIUM SERPL-SCNC: 138 MMOL/L (ref 134–144)
TRIGL SERPL-MCNC: 145 MG/DL (ref 0–149)
TSH SERPL DL<=0.005 MIU/L-ACNC: 1.37 UIU/ML (ref 0.45–4.5)
TTG IGA SER-ACNC: <2 U/ML (ref 0–3)
TTG IGG SER-ACNC: <2 U/ML (ref 0–5)
WBC # BLD AUTO: 5.5 X10E3/UL (ref 3.4–10.8)

## 2024-02-20 ENCOUNTER — PATIENT MESSAGE (OUTPATIENT)
Dept: FAMILY MEDICINE CLINIC | Facility: CLINIC | Age: 34
End: 2024-02-20

## 2024-02-22 LAB
HEMOCCULT STL QL IA: NEGATIVE
Lab: NORMAL
WBC SPEC QL GRAM STN: NORMAL

## 2024-03-08 ENCOUNTER — OFFICE VISIT (OUTPATIENT)
Dept: FAMILY MEDICINE CLINIC | Facility: CLINIC | Age: 34
End: 2024-03-08
Payer: COMMERCIAL

## 2024-03-08 VITALS
BODY MASS INDEX: 23.14 KG/M2 | WEIGHT: 144 LBS | DIASTOLIC BLOOD PRESSURE: 70 MMHG | SYSTOLIC BLOOD PRESSURE: 116 MMHG | HEART RATE: 75 BPM | TEMPERATURE: 99.4 F | RESPIRATION RATE: 17 BRPM | HEIGHT: 66 IN

## 2024-03-08 DIAGNOSIS — N64.4 BREAST PAIN, RIGHT: ICD-10-CM

## 2024-03-08 DIAGNOSIS — R10.32 LEFT LOWER QUADRANT ABDOMINAL PAIN: Primary | ICD-10-CM

## 2024-03-08 PROCEDURE — 99214 OFFICE O/P EST MOD 30 MIN: CPT | Performed by: FAMILY MEDICINE

## 2024-03-08 NOTE — PROGRESS NOTES
"Name: Jackie Burns      : 1990      MRN: [id number]  Encounter Provider: Kasandra Drummond DO  Encounter Date: 3/8/2024   Encounter department: [facility]    Assessment & Plan     1. Left lower quadrant abdominal pain  Comments:  Ongoing pain lab work has been normal  Orders:  -     CT abdomen pelvis w contrast; Future; Expected date: 2024  -     Ambulatory referral to Gastroenterology; Future    2. Breast pain, right  Comments:  new  Orders:  -     US breast right limited (diagnostic); Future; Expected date: 2024         Return if symptoms worsen or fail to improve.  Subjective      She continues to have left sided abdominal pain.   She is having diarrhea.  She can't figure out a food trigger.     She has also been having pain in the right side of her right breast.  She has not had any trauma and is concerned because she has a family history of cancer.      Review of Systems   Gastrointestinal:  Positive for abdominal distention, abdominal pain, diarrhea and nausea.       Current Outpatient Medications on File Prior to Visit   Medication Sig    norgestimate-ethinyl estradiol (Tri-Estarylla) 0.18/0.215/0.25 MG-35 MCG per tablet Take 1 tablet by mouth daily       Objective     /70   Pulse 75   Temp 99.4 °F (37.4 °C)   Resp 17   Ht 5' 6\" (1.676 m)   Wt 65.3 kg (144 lb)   LMP 2024 (Approximate)   BMI 23.24 kg/m²     Physical Exam  Vitals and nursing note reviewed.   Constitutional:       Appearance: She is well-developed.   HENT:      Head: Normocephalic and atraumatic.      Right Ear: Tympanic membrane and external ear normal.      Left Ear: Tympanic membrane and external ear normal.   Cardiovascular:      Rate and Rhythm: Normal rate and regular rhythm.      Heart sounds: Normal heart sounds. No murmur heard.     No friction rub.   Pulmonary:      Effort: Pulmonary effort is normal. No respiratory distress.      Breath sounds: Normal breath sounds. No wheezing or " rales.   Chest:      Chest wall: Tenderness (right lateral breast) present. No mass, lacerations or deformity.   Abdominal:      General: Abdomen is flat. Bowel sounds are normal. There is no distension.      Palpations: Abdomen is soft. There is no mass.      Tenderness: There is abdominal tenderness (left sided abdomen). There is no guarding or rebound.   Musculoskeletal:      Right lower leg: No edema.      Left lower leg: No edema.       Kasandra Drummond, DO

## 2024-03-15 ENCOUNTER — HOSPITAL ENCOUNTER (OUTPATIENT)
Dept: RADIOLOGY | Facility: HOSPITAL | Age: 34
Discharge: HOME | End: 2024-03-15
Payer: COMMERCIAL

## 2024-03-15 DIAGNOSIS — R10.32 LEFT LOWER QUADRANT ABDOMINAL PAIN: ICD-10-CM

## 2024-03-15 PROCEDURE — 74177 CT ABD & PELVIS W/CONTRAST: CPT

## 2024-03-15 RX ADMIN — IOHEXOL 100 ML: 350 INJECTION, SOLUTION INTRAVENOUS at 08:04

## 2024-04-19 ENCOUNTER — OFFICE VISIT (OUTPATIENT)
Dept: GASTROENTEROLOGY | Facility: CLINIC | Age: 34
End: 2024-04-19

## 2024-04-19 VITALS
WEIGHT: 142.2 LBS | DIASTOLIC BLOOD PRESSURE: 87 MMHG | BODY MASS INDEX: 22.85 KG/M2 | HEART RATE: 87 BPM | SYSTOLIC BLOOD PRESSURE: 126 MMHG | HEIGHT: 66 IN

## 2024-04-19 DIAGNOSIS — R11.0 NAUSEA: ICD-10-CM

## 2024-04-19 DIAGNOSIS — R10.32 LEFT LOWER QUADRANT ABDOMINAL PAIN: ICD-10-CM

## 2024-04-19 DIAGNOSIS — R19.4 CHANGE IN BOWEL HABIT: ICD-10-CM

## 2024-04-19 DIAGNOSIS — R10.32 LLQ PAIN: Primary | ICD-10-CM

## 2024-04-19 NOTE — LETTER
April 19, 2024     Kasandra Drummond, DO  [address]   [address]  [place] [address]    Patient: Jackie Burns   YOB: 1990   Date of Visit: 4/19/2024       Dear Dr. Drummond:    Thank you for referring Jackie Burns to me for evaluation. Below are my notes for this consultation.    If you have questions, please do not hesitate to call me. I look forward to following your patient along with you.         Sincerely,        Placido Xiao PA-C        CC: No Recipients    Placido Xiao PA-C  4/19/2024  9:10 AM  Incomplete  [facility] - New Patient Office Visit  Jackie Burns 33 y.o. female MRN: [id number]  Encounter: [id number]          ASSESSMENT AND PLAN:      1. LLQ pain  - CAT scan was ordered and performed on March 15, 2024 showing fatty liver, postcholecystectomy state and diverticulosis but no evidence of diverticulitis.    - February 2024 TSH was normal, celiac panel negative, fecal leukocyte test was negative and fecal occult blood test negative.  -Plan colonoscopy to rule out underlying colitis    2.  Change in bowel habits  - she does not feel she need dicyclomine or any meds.    3.  Nausea  -     ______________________________________________________________________    Chief Complaint: Left lower quadrant abdominal pain    HPI: This is a 33-year-old female with history of anal fissure, postcholecystectomy (around 2018) who presents with concerns about left lower quadrant abdominal pain.  She went to see her primary care on March 8, 2024 and noted the left lower quadrant abdominal pain.  It is associated at times with loose stool and nausea.  A CAT scan was ordered and performed on March 15, 2024 showing fatty liver, postcholecystectomy state and diverticulosis but no evidence of diverticulitis.  In February 2024 TSH was normal, celiac panel negative, fecal leukocyte test was negative and fecal occult blood test  negative.    Abdominal pain  How long have you had the abdominal pain - years  Where is the pain located - LLQ  How do you describe the pain - dull achy   How often do you have the pain - daily  How long does the pain last - all day  Does anything make the pain worse - unknown  Does anything make the pain better - nothing that she has noted  Any significant use of NSAIDS - No  Any change in bowels - loose to normal.  Any black or bloody stools - No      Endoscopy History:  EGD -none previously  Colonoscopy -none previously    REVIEW OF SYSTEMS:    CONSTITUTIONAL: Denies any fever, chills, rigors, and weight loss.  HEENT: Denies hearing loss or visual disturbances.  CARDIOVASCULAR: No chest pain or palpitations.   RESPIRATORY: Denies any cough, hemoptysis, shortness of breath or dyspnea on exertion.  GASTROINTESTINAL: As noted in the History of Present Illness.   GENITOURINARY: No problems with urination. Denies any hematuria or dysuria.  NEUROLOGIC: No dizziness or vertigo, denies headaches.   MUSCULOSKELETAL: Denies any muscle or joint pain.   SKIN: Denies skin rashes or itching.   ENDOCRINE: Denies excessive thirst. Denies intolerance to heat or cold.  PSYCHOSOCIAL: Denies depression or anxiety. Denies any recent memory loss.       Historical Information  Past Medical History:   Diagnosis Date   • Anal fissure     Last assessed 12/19/2013   • Gall bladder disease    • Skin lesion     Last assessed 5/20/2016     Past Surgical History:   Procedure Laterality Date   • CHOLECYSTECTOMY     • WISDOM TOOTH EXTRACTION       Social History  Social History     Substance and Sexual Activity   Alcohol Use Yes    Comment: Once a month     Social History     Substance and Sexual Activity   Drug Use No     Social History     Tobacco Use   Smoking Status Never   • Passive exposure: Never   Smokeless Tobacco Never     Family History   Problem Relation Age of Onset   • Other Mother    • No Known Problems Father    • Cholecystitis  "Sister    • Colon cancer Maternal Grandmother    • Colon cancer Maternal Uncle    • Mental illness Neg Hx        Meds/Allergies      Current Outpatient Medications:   •  norgestimate-ethinyl estradiol (Tri-Estarylla) 0.18/0.215/0.25 MG-35 MCG per tablet    No Known Allergies        Objective    Blood pressure 126/87, pulse 87, height 5' 6\" (1.676 m), weight 64.5 kg (142 lb 3.2 oz), not currently breastfeeding. Body mass index is 22.95 kg/m².        PHYSICAL EXAM:      General Appearance:   Alert, cooperative, no distress, appears stated age   HEENT:   Normocephalic, atraumatic, anicteric.     Neck:  Supple, symmetrical   Lungs:   Clear to auscultation bilaterally; no rales, rhonchi or wheezing; respirations unlabored    Heart::   Regular rate and rhythm; no murmur, rub, or gallop.   Abdomen:   LLQ TTP mild, Soft,  non-distended; normal bowel sounds; no masses, no organomegaly    Genitalia:   Deferred    Rectal:   Deferred    Extremities:  No cyanosis, clubbing or edema    Pulses:  Not assessed   Skin:  No jaundice, rashes, or lesions    Lymph nodes:  Not assessed       Lab Results:   No visits with results within 1 Day(s) from this visit.   Latest known visit with results is:   Orders Only on 02/21/2024   Component Date Value   • Occult Blood, Fecal IA 02/21/2024 Negative    • White Blood Cells, Stool 02/21/2024 Final report    • Result 1 02/21/2024 Comment          Radiology Results:   No results found.    Placido Xiao PA-C  4/19/2024  8:05 AM  Sign when Signing Visit  [facility] - New Patient Office Visit  Jackie Burns 33 y.o. female MRN: [id number]  Encounter: [id number]          ASSESSMENT AND PLAN:      1. LLQ pain  - CAT scan was ordered and performed on March 15, 2024 showing fatty liver, postcholecystectomy state and diverticulosis but no evidence of diverticulitis.    - February 2024 TSH was normal, celiac panel negative, fecal " leukocyte test was negative and fecal occult blood test negative.  -Trial of dicyclomine  -Plan colonoscopy to rule out underlying colitis    2.  Change in bowel habits  -Question bile acid diarrhea  -Will do trial of Questran    3.  Nausea  -     ______________________________________________________________________    Chief Complaint: Left lower quadrant abdominal pain    HPI: This is a 33-year-old female with history of anal fissure, postcholecystectomy who presents with concerns about left lower quadrant abdominal pain.  She went to see her primary care on March 8, 2024 and noted the left lower quadrant abdominal pain.  It is associated at times with loose stool and nausea.  A CAT scan was ordered and performed on March 15, 2024 showing fatty liver, postcholecystectomy state and diverticulosis but no evidence of diverticulitis.  In February 2024 TSH was normal, celiac panel negative, fecal leukocyte test was negative and fecal occult blood test negative.    Endoscopy History:  EGD -none previously  Colonoscopy -none previously    REVIEW OF SYSTEMS:    CONSTITUTIONAL: Denies any fever, chills, rigors, and weight loss.  HEENT: Denies hearing loss or visual disturbances.  CARDIOVASCULAR: No chest pain or palpitations.   RESPIRATORY: Denies any cough, hemoptysis, shortness of breath or dyspnea on exertion.  GASTROINTESTINAL: As noted in the History of Present Illness.   GENITOURINARY: No problems with urination. Denies any hematuria or dysuria.  NEUROLOGIC: No dizziness or vertigo, denies headaches.   MUSCULOSKELETAL: Denies any muscle or joint pain.   SKIN: Denies skin rashes or itching.   ENDOCRINE: Denies excessive thirst. Denies intolerance to heat or cold.  PSYCHOSOCIAL: Denies depression or anxiety. Denies any recent memory loss.       Historical Information  Past Medical History:   Diagnosis Date   • Anal fissure     Last assessed 12/19/2013   • Gall bladder disease    • Skin lesion     Last assessed  5/20/2016     Past Surgical History:   Procedure Laterality Date   • CHOLECYSTECTOMY     • WISDOM TOOTH EXTRACTION       Social History  Social History     Substance and Sexual Activity   Alcohol Use Yes    Comment: social - shots - 5 x month     Social History     Substance and Sexual Activity   Drug Use No     Social History     Tobacco Use   Smoking Status Never   • Passive exposure: Never   Smokeless Tobacco Never     Family History   Problem Relation Age of Onset   • Other Mother    • No Known Problems Father    • Cholecystitis Sister    • Mental illness Neg Hx        Meds/Allergies      Current Outpatient Medications:   •  norgestimate-ethinyl estradiol (Tri-Estarylla) 0.18/0.215/0.25 MG-35 MCG per tablet    No Known Allergies        Objective    not currently breastfeeding. There is no height or weight on file to calculate BMI.        PHYSICAL EXAM:      General Appearance:   Alert, cooperative, no distress, appears stated age   HEENT:   Normocephalic, atraumatic, anicteric.     Neck:  Supple, symmetrical   Lungs:   Clear to auscultation bilaterally; no rales, rhonchi or wheezing; respirations unlabored    Heart::   Regular rate and rhythm; no murmur, rub, or gallop.   Abdomen:   Soft, non-tender, non-distended; normal bowel sounds; no masses, no organomegaly    Genitalia:   Deferred    Rectal:   Deferred    Extremities:  No cyanosis, clubbing or edema    Pulses:  Not assessed   Skin:  No jaundice, rashes, or lesions    Lymph nodes:  Not assessed       Lab Results:   No visits with results within 1 Day(s) from this visit.   Latest known visit with results is:   Orders Only on 02/21/2024   Component Date Value   • Occult Blood, Fecal IA 02/21/2024 Negative    • White Blood Cells, Stool 02/21/2024 Final report    • Result 1 02/21/2024 Comment          Radiology Results:   No results found.    Placido Xiao PA-C

## 2024-04-19 NOTE — PATIENT INSTRUCTIONS
Scheduled date of colonoscopy (as of today): 5/24/24  Physician performing colonoscopy: Stephen  Location of colonoscopy: [facility]  Bowel prep reviewed with patient: Miralax/dulcolax  Instructions reviewed with patient by: Pippa CHRISTIANSON  Clearances: n/a

## 2024-04-19 NOTE — PROGRESS NOTES
[facility]astroenterology Specialists - New Patient Office Visit  Jackie Burns 33 y.o. female MRN: [id number]  Encounter: [id number]          ASSESSMENT AND PLAN:      1. LLQ pain  - CAT scan was ordered and performed on March 15, 2024 showing fatty liver, postcholecystectomy state and diverticulosis but no evidence of diverticulitis.    - February 2024 TSH was normal, celiac panel negative, fecal leukocyte test was negative and fecal occult blood test negative.  -Plan colonoscopy to rule out underlying colitis    2.  Change in bowel habits  - she does not feel she need dicyclomine or any meds.    3.  Nausea  -     ______________________________________________________________________    Chief Complaint: Left lower quadrant abdominal pain    HPI: This is a 33-year-old female with history of anal fissure, postcholecystectomy (around 2018) who presents with concerns about left lower quadrant abdominal pain.  She went to see her primary care on March 8, 2024 and noted the left lower quadrant abdominal pain.  It is associated at times with loose stool and nausea.  A CAT scan was ordered and performed on March 15, 2024 showing fatty liver, postcholecystectomy state and diverticulosis but no evidence of diverticulitis.  In February 2024 TSH was normal, celiac panel negative, fecal leukocyte test was negative and fecal occult blood test negative.    Abdominal pain  How long have you had the abdominal pain - years  Where is the pain located - LLQ  How do you describe the pain - dull achy   How often do you have the pain - daily  How long does the pain last - all day  Does anything make the pain worse - unknown  Does anything make the pain better - nothing that she has noted  Any significant use of NSAIDS - No  Any change in bowels - loose to normal.  Any black or bloody stools - No      Endoscopy History:  EGD -none previously  Colonoscopy -none previously    REVIEW OF SYSTEMS:    CONSTITUTIONAL: Denies any fever,  "chills, rigors, and weight loss.  HEENT: Denies hearing loss or visual disturbances.  CARDIOVASCULAR: No chest pain or palpitations.   RESPIRATORY: Denies any cough, hemoptysis, shortness of breath or dyspnea on exertion.  GASTROINTESTINAL: As noted in the History of Present Illness.   GENITOURINARY: No problems with urination. Denies any hematuria or dysuria.  NEUROLOGIC: No dizziness or vertigo, denies headaches.   MUSCULOSKELETAL: Denies any muscle or joint pain.   SKIN: Denies skin rashes or itching.   ENDOCRINE: Denies excessive thirst. Denies intolerance to heat or cold.  PSYCHOSOCIAL: Denies depression or anxiety. Denies any recent memory loss.       Historical Information   Past Medical History:   Diagnosis Date   • Anal fissure     Last assessed 12/19/2013   • Gall bladder disease    • Skin lesion     Last assessed 5/20/2016     Past Surgical History:   Procedure Laterality Date   • CHOLECYSTECTOMY     • WISDOM TOOTH EXTRACTION       Social History   Social History     Substance and Sexual Activity   Alcohol Use Yes    Comment: Once a month     Social History     Substance and Sexual Activity   Drug Use No     Social History     Tobacco Use   Smoking Status Never   • Passive exposure: Never   Smokeless Tobacco Never     Family History   Problem Relation Age of Onset   • Other Mother    • No Known Problems Father    • Cholecystitis Sister    • Colon cancer Maternal Grandmother    • Colon cancer Maternal Uncle    • Mental illness Neg Hx        Meds/Allergies       Current Outpatient Medications:   •  norgestimate-ethinyl estradiol (Tri-Estarylla) 0.18/0.215/0.25 MG-35 MCG per tablet    No Known Allergies        Objective     Blood pressure 126/87, pulse 87, height 5' 6\" (1.676 m), weight 64.5 kg (142 lb 3.2 oz), not currently breastfeeding. Body mass index is 22.95 kg/m².        PHYSICAL EXAM:      General Appearance:   Alert, cooperative, no distress, appears stated age   HEENT:   Normocephalic, atraumatic, " anicteric.     Neck:  Supple, symmetrical   Lungs:   Clear to auscultation bilaterally; no rales, rhonchi or wheezing; respirations unlabored    Heart::   Regular rate and rhythm; no murmur, rub, or gallop.   Abdomen:   LLQ TTP mild, Soft,  non-distended; normal bowel sounds; no masses, no organomegaly    Genitalia:   Deferred    Rectal:   Deferred    Extremities:  No cyanosis, clubbing or edema    Pulses:  Not assessed   Skin:  No jaundice, rashes, or lesions    Lymph nodes:  Not assessed       Lab Results:   No visits with results within 1 Day(s) from this visit.   Latest known visit with results is:   Orders Only on 02/21/2024   Component Date Value   • Occult Blood, Fecal IA 02/21/2024 Negative    • White Blood Cells, Stool 02/21/2024 Final report    • Result 1 02/21/2024 Comment          Radiology Results:   No results found.    Placido Xiao PA-C

## 2024-05-08 ENCOUNTER — HOSPITAL ENCOUNTER (OUTPATIENT)
Dept: RADIOLOGY | Facility: HOSPITAL | Age: 34
Discharge: HOME | End: 2024-05-08
Payer: COMMERCIAL

## 2024-05-08 VITALS — HEIGHT: 66 IN | BODY MASS INDEX: 22.5 KG/M2 | WEIGHT: 140 LBS

## 2024-05-08 DIAGNOSIS — N64.4 BREAST PAIN, RIGHT: ICD-10-CM

## 2024-05-08 PROCEDURE — G0279 TOMOSYNTHESIS, MAMMO: HCPCS

## 2024-05-08 PROCEDURE — 76642 ULTRASOUND BREAST LIMITED: CPT

## 2024-05-08 PROCEDURE — 77066 DX MAMMO INCL CAD BI: CPT

## 2024-05-09 ENCOUNTER — ANESTHESIA EVENT (OUTPATIENT)
Dept: ANESTHESIOLOGY | Facility: HOSPITAL | Age: 34
End: 2024-05-09

## 2024-05-09 ENCOUNTER — ANESTHESIA (OUTPATIENT)
Dept: ANESTHESIOLOGY | Facility: HOSPITAL | Age: 34
End: 2024-05-09

## 2024-05-24 ENCOUNTER — ANESTHESIA (OUTPATIENT)
Dept: GASTROENTEROLOGY | Facility: AMBULARY SURGERY CENTER | Age: 34
End: 2024-05-24

## 2024-05-24 ENCOUNTER — ANESTHESIA EVENT (OUTPATIENT)
Dept: GASTROENTEROLOGY | Facility: AMBULARY SURGERY CENTER | Age: 34
End: 2024-05-24

## 2024-05-24 ENCOUNTER — HOSPITAL ENCOUNTER (OUTPATIENT)
Dept: GASTROENTEROLOGY | Facility: AMBULARY SURGERY CENTER | Age: 34
Setting detail: OUTPATIENT SURGERY
End: 2024-05-24
Attending: INTERNAL MEDICINE
Payer: COMMERCIAL

## 2024-05-24 VITALS
DIASTOLIC BLOOD PRESSURE: 58 MMHG | TEMPERATURE: 98.1 F | SYSTOLIC BLOOD PRESSURE: 94 MMHG | HEART RATE: 70 BPM | OXYGEN SATURATION: 100 % | RESPIRATION RATE: 18 BRPM

## 2024-05-24 DIAGNOSIS — R19.4 CHANGE IN BOWEL HABIT: ICD-10-CM

## 2024-05-24 LAB
EXT PREGNANCY TEST URINE: NEGATIVE
EXT. CONTROL: NORMAL

## 2024-05-24 PROCEDURE — 81025 URINE PREGNANCY TEST: CPT | Performed by: ANESTHESIOLOGY

## 2024-05-24 PROCEDURE — 88305 TISSUE EXAM BY PATHOLOGIST: CPT | Performed by: PATHOLOGY

## 2024-05-24 PROCEDURE — 45380 COLONOSCOPY AND BIOPSY: CPT | Performed by: INTERNAL MEDICINE

## 2024-05-24 RX ORDER — LIDOCAINE HYDROCHLORIDE 10 MG/ML
INJECTION, SOLUTION EPIDURAL; INFILTRATION; INTRACAUDAL; PERINEURAL AS NEEDED
Status: DISCONTINUED | OUTPATIENT
Start: 2024-05-24 | End: 2024-05-24

## 2024-05-24 RX ORDER — PROPOFOL 10 MG/ML
INJECTION, EMULSION INTRAVENOUS AS NEEDED
Status: DISCONTINUED | OUTPATIENT
Start: 2024-05-24 | End: 2024-05-24

## 2024-05-24 RX ORDER — SODIUM CHLORIDE, SODIUM LACTATE, POTASSIUM CHLORIDE, CALCIUM CHLORIDE 600; 310; 30; 20 MG/100ML; MG/100ML; MG/100ML; MG/100ML
125 INJECTION, SOLUTION INTRAVENOUS CONTINUOUS
Status: DISCONTINUED | OUTPATIENT
Start: 2024-05-24 | End: 2024-05-28 | Stop reason: HOSPADM

## 2024-05-24 RX ORDER — PROPOFOL 10 MG/ML
INJECTION, EMULSION INTRAVENOUS CONTINUOUS PRN
Status: DISCONTINUED | OUTPATIENT
Start: 2024-05-24 | End: 2024-05-24

## 2024-05-24 RX ADMIN — LIDOCAINE HYDROCHLORIDE 50 MG: 10 INJECTION, SOLUTION EPIDURAL; INFILTRATION; INTRACAUDAL; PERINEURAL at 13:34

## 2024-05-24 RX ADMIN — PROPOFOL 140 MCG/KG/MIN: 10 INJECTION, EMULSION INTRAVENOUS at 13:34

## 2024-05-24 RX ADMIN — SODIUM CHLORIDE, SODIUM LACTATE, POTASSIUM CHLORIDE, AND CALCIUM CHLORIDE 125 ML/HR: .6; .31; .03; .02 INJECTION, SOLUTION INTRAVENOUS at 13:02

## 2024-05-24 RX ADMIN — PROPOFOL 20 MG: 10 INJECTION, EMULSION INTRAVENOUS at 13:37

## 2024-05-24 RX ADMIN — PROPOFOL 50 MG: 10 INJECTION, EMULSION INTRAVENOUS at 13:40

## 2024-05-24 RX ADMIN — PROPOFOL 100 MG: 10 INJECTION, EMULSION INTRAVENOUS at 13:34

## 2024-05-24 NOTE — ANESTHESIA POSTPROCEDURE EVALUATION
Post-Op Assessment Note    CV Status:  Stable  Pain Score: 0    Pain management: adequate       Mental Status:  Sleepy   Hydration Status:  Stable   PONV Controlled:  None   Airway Patency:  Patent     Post Op Vitals Reviewed: Yes    No anethesia notable event occurred.    Staff: Anesthesiologist, CRNA               BP   101/58   Temp      Pulse  70   Resp   14   SpO2   98

## 2024-05-24 NOTE — H&P
History and Physical -  Gastroenterology Specialists  Jackie Burns 33 y.o. female MRN: [id number]    HPI: Jackie Burns is a 33 y.o. year old female who presents presents for left lower quadrant abdominal pain.      Review of Systems    Historical Information   Past Medical History:   Diagnosis Date    Anal fissure     Last assessed 12/19/2013    Gall bladder disease     Skin lesion     Last assessed 5/20/2016     Past Surgical History:   Procedure Laterality Date    CHOLECYSTECTOMY      WISDOM TOOTH EXTRACTION       Social History   Social History     Substance and Sexual Activity   Alcohol Use Yes    Comment: Once a month     Social History     Substance and Sexual Activity   Drug Use No     Social History     Tobacco Use   Smoking Status Never    Passive exposure: Never   Smokeless Tobacco Never     Family History   Problem Relation Age of Onset    Other Mother     No Known Problems Father     Cholecystitis Sister     Colon cancer Maternal Grandmother     Melanoma Paternal Grandfather         later in life    Colon cancer Maternal Uncle     Ovarian cancer Maternal Great-Grandmother     Breast cancer Maternal Great-Grandmother         later in life    Mental illness Neg Hx        Meds/Allergies     Not in a hospital admission.    No Known Allergies    Objective     /78   Pulse 77   Temp 98.1 °F (36.7 °C) (Temporal)   Resp 16   LMP 04/23/2024 (Exact Date)   SpO2 100%       PHYSICAL EXAM    Gen: NAD  CV: RRR  CHEST: Clear  ABD: soft, NT/ND  EXT: no edema  Neuro: AAO      ASSESSMENT/PLAN:  This is a 33 y.o. year old female here for evaluation of left lower quadrant pain    PLAN:   Procedure: Colonoscopy.

## 2024-05-24 NOTE — ANESTHESIA PREPROCEDURE EVALUATION
Procedure:  COLONOSCOPY    Relevant Problems   Other   (+) Oral contraceptive prescribed        Physical Exam    Airway    Mallampati score: I  TM Distance: >3 FB  Neck ROM: full     Dental   Comment: Denies loose teeth     Cardiovascular  Cardiovascular exam normal    Pulmonary  Pulmonary exam normal     Other Findings  Portions of exam deferred due to low yield and/or unknown COVID statuspost-pubertal.      Anesthesia Plan  ASA Score- 1     Anesthesia Type- IV sedation with anesthesia with ASA Monitors.         Additional Monitors:     Airway Plan:            Plan Factors-Exercise tolerance (METS): >4 METS.    Chart reviewed.   Existing labs reviewed. Patient summary reviewed.    Patient is not a current smoker.              Induction- intravenous.    Postoperative Plan-         Informed Consent- Anesthetic plan and risks discussed with patient.  I personally reviewed this patient with the CRNA. Discussed and agreed on the Anesthesia Plan with the CRNA..

## 2024-05-30 PROCEDURE — 88305 TISSUE EXAM BY PATHOLOGIST: CPT | Performed by: PATHOLOGY

## 2024-08-13 ENCOUNTER — OFFICE VISIT (OUTPATIENT)
Dept: GASTROENTEROLOGY | Facility: CLINIC | Age: 34
End: 2024-08-13
Payer: COMMERCIAL

## 2024-08-13 VITALS
HEART RATE: 74 BPM | WEIGHT: 179.8 LBS | DIASTOLIC BLOOD PRESSURE: 72 MMHG | BODY MASS INDEX: 28.9 KG/M2 | HEIGHT: 66 IN | SYSTOLIC BLOOD PRESSURE: 98 MMHG

## 2024-08-13 DIAGNOSIS — R11.0 NAUSEA: ICD-10-CM

## 2024-08-13 DIAGNOSIS — R19.4 CHANGE IN BOWEL HABITS: ICD-10-CM

## 2024-08-13 DIAGNOSIS — R10.32 LEFT LOWER QUADRANT PAIN: Primary | ICD-10-CM

## 2024-08-13 PROCEDURE — 99213 OFFICE O/P EST LOW 20 MIN: CPT | Performed by: PHYSICIAN ASSISTANT

## 2024-08-13 NOTE — PROGRESS NOTES
[facility]astroenterology Specialists - New Patient Office Visit  Jackie Burns 33 y.o. adult MRN: [id number]  Encounter: [id number]          ASSESSMENT AND PLAN:      1. LLQ pain  - CAT scan was ordered and performed on March 15, 2024 showing fatty liver, postcholecystectomy state and diverticulosis but no evidence of diverticulitis.    - February 2024 TSH was normal, celiac panel negative, fecal leukocyte test was negative and fecal occult blood test negative.  - Colonoscopy May 24, 2024 with adequate bowel prep showing only sigmoid diverticulosis.  Random colon biopsies negative for microscopic colitis  - resolved with change in diet  - limits dairy    2.  Change in bowel habits  - she does not feel she need dicyclomine or any meds.    3.  Nausea  - resolved      ______________________________________________________________________    Chief Complaint: Left lower quadrant abdominal pain    HPI: This is a 33-year-old female with history of anal fissure, postcholecystectomy (around 2018) who presents with concerns about left lower quadrant abdominal pain.  She went to see her primary care on March 8, 2024 and noted the left lower quadrant abdominal pain.  It is associated at times with loose stool and nausea.  A CAT scan was ordered and performed on March 15, 2024 showing fatty liver, postcholecystectomy state and diverticulosis but no evidence of diverticulitis.  In February 2024 TSH was normal, celiac panel negative, fecal leukocyte test was negative and fecal occult blood test negative.    Interval History:  She underwent a colonoscopy on May 24, 2024 with adequate bowel prep showing only sigmoid diverticulosis.  Random colon biopsies negative for microscopic colitis.  Symptoms of pain resolved.  She has adjusted her diet and limited dairy which seems to be the trigger for her.    Patient denies any abdominal pain, nausea/vomiting, pain or difficulty swallowing, change in bowels, black or bloody  stools      Endoscopy History:  EGD -none previously  Colonoscopy -May 24, 2024 with adequate bowel prep showing only sigmoid diverticulosis.  Random colon biopsies negative for microscopic colitis    REVIEW OF SYSTEMS:    CONSTITUTIONAL: Denies any fever, chills, rigors, and weight loss.  HEENT: Denies hearing loss or visual disturbances.  CARDIOVASCULAR: No chest pain or palpitations.   RESPIRATORY: Denies any cough, hemoptysis, shortness of breath or dyspnea on exertion.  GASTROINTESTINAL: As noted in the History of Present Illness.   GENITOURINARY: No problems with urination. Denies any hematuria or dysuria.  NEUROLOGIC: No dizziness or vertigo, denies headaches.   MUSCULOSKELETAL: Denies any muscle or joint pain.   SKIN: Denies skin rashes or itching.   ENDOCRINE: Denies excessive thirst. Denies intolerance to heat or cold.  PSYCHOSOCIAL: Denies depression or anxiety. Denies any recent memory loss.       Historical Information   Past Medical History:   Diagnosis Date   • Anal fissure     Last assessed 12/19/2013   • Gall bladder disease    • Skin lesion     Last assessed 5/20/2016     Past Surgical History:   Procedure Laterality Date   • CHOLECYSTECTOMY     • WISDOM TOOTH EXTRACTION       Social History   Social History     Substance and Sexual Activity   Alcohol Use Yes    Comment: Once a month     Social History     Substance and Sexual Activity   Drug Use No     Social History     Tobacco Use   Smoking Status Never   • Passive exposure: Never   Smokeless Tobacco Never     Family History   Problem Relation Age of Onset   • Other Mother    • No Known Problems Father    • Cholecystitis Sister    • Colon cancer Maternal Grandmother    • Melanoma Paternal Grandfather         later in life   • Colon cancer Maternal Uncle    • Ovarian cancer Maternal Great-Grandmother    • Breast cancer Maternal Great-Grandmother         later in life   • Mental illness Neg Hx        Meds/Allergies       Current Outpatient  "Medications:   •  norgestimate-ethinyl estradiol (Tri-Estarylla) 0.18/0.215/0.25 MG-35 MCG per tablet    No Known Allergies        Objective     Blood pressure 98/72, pulse 74, height 5' 6\" (1.676 m), weight 81.6 kg (179 lb 12.8 oz), not currently breastfeeding. Body mass index is 29.02 kg/m².        PHYSICAL EXAM:      General Appearance:   Alert, cooperative, no distress, appears stated age   HEENT:   Normocephalic, atraumatic, anicteric.     Neck:  Supple, symmetrical   Lungs:   Clear to auscultation bilaterally; no rales, rhonchi or wheezing; respirations unlabored    Heart::   Regular rate and rhythm; no murmur, rub, or gallop.   Abdomen:   LLQ TTP mild, Soft,  non-distended; normal bowel sounds; no masses, no organomegaly    Genitalia:   Deferred    Rectal:   Deferred    Extremities:  No cyanosis, clubbing or edema    Pulses:  Not assessed   Skin:  No jaundice, rashes, or lesions    Lymph nodes:  Not assessed       Lab Results:   No visits with results within 1 Day(s) from this visit.   Latest known visit with results is:   Hospital Outpatient Visit on 05/24/2024   Component Date Value   • EXT Preg Test, Ur 05/24/2024 Negative    • Control 05/24/2024 Valid    • Case Report 05/24/2024                      Value:Surgical Pathology Report                         Case: [id number]                                  Authorizing Provider:  Laura Mitchell MD       Collected:           05/24/2024 1346              Ordering Location:     [facility]   Received:            05/24/2024 [address] [place] [address]                                                                       Pathologist:           Azar Phillip DO                                                            Specimen:    Colon, Random colon  bx r/o microscopic colitis                                           • Final Diagnosis 05/24/2024                      Value:A. Colon, random, biopsy:  -Colonic mucosa with no " "significant histopathologic change.      • Additional Information 05/24/2024                      Value:All reported additional testing was performed with appropriately reactive controls.  These tests were developed and their performance characteristics determined by [facility] or appropriate performing facility, though some tests may be performed on tissues which have not been validated for performance characteristics (such as staining performed on alcohol exposed cell blocks and decalcified tissues).  Results should be interpreted with caution and in the context of the patients’ clinical condition. These tests may not be cleared or approved by the U.S. Food and Drug Administration, though the FDA has determined that such clearance or approval is not necessary. These tests are used for clinical purposes and they should not be regarded as investigational or for research. This laboratory has been approved by [place] 88, designated as a high-complexity laboratory and is qualified to perform these tests.    Interpretation performed at [facility], [address] [place] [id number]     • Gross Description 05/24/2024                      Value:A. The specimen is received in formalin, labeled with the patient's name and hospital number, and is designated \" random colon biopsy rule out microscopic colitis\".  The specimen consists of 3 tan soft tissue fragments measuring 0.2, 0.2 and 0.3 cm.  Entirely submitted. Screened cassette.    Note: The estimated total formalin fixation time based upon information provided by the submitting clinician and the standard processing schedule is under 72 hours.    [facility]           Radiology Results:   No results found.    Placido Xiao PA-C  "

## 2024-08-13 NOTE — LETTER
August 13, 2024     Kasandra Drummond, DO  [address]   [address]  [facility] [address]    Patient: Jackie Burns   YOB: 1990   Date of Visit: 8/13/2024       Dear Dr. Drummond:    Thank you for referring Jackie Burns to me for evaluation. Below are my notes for this consultation.    If you have questions, please do not hesitate to call me. I look forward to following your patient along with you.         Sincerely,        Placido Xiao PA-C        CC: No Recipients    Placido Xiao PA-C  8/13/2024  9:44 AM  Incomplete  [facility] Gastroenterology Specialists - New Patient Office Visit  Jackie Burns 33 y.o. adult MRN: [id number]  Encounter: [id number]          ASSESSMENT AND PLAN:      1. LLQ pain  - CAT scan was ordered and performed on March 15, 2024 showing fatty liver, postcholecystectomy state and diverticulosis but no evidence of diverticulitis.    - February 2024 TSH was normal, celiac panel negative, fecal leukocyte test was negative and fecal occult blood test negative.  - Colonoscopy May 24, 2024 with adequate bowel prep showing only sigmoid diverticulosis.  Random colon biopsies negative for microscopic colitis  - resolved with change in diet  - limits dairy    2.  Change in bowel habits  - she does not feel she need dicyclomine or any meds.    3.  Nausea  - resolved      ______________________________________________________________________    Chief Complaint: Left lower quadrant abdominal pain    HPI: This is a 33-year-old female with history of anal fissure, postcholecystectomy (around 2018) who presents with concerns about left lower quadrant abdominal pain.  She went to see her primary care on March 8, 2024 and noted the left lower quadrant abdominal pain.  It is associated at times with loose stool and nausea.  A CAT scan was ordered and performed on March 15, 2024 showing fatty liver, postcholecystectomy state and diverticulosis but no evidence of  diverticulitis.  In February 2024 TSH was normal, celiac panel negative, fecal leukocyte test was negative and fecal occult blood test negative.    Interval History:  She underwent a colonoscopy on May 24, 2024 with adequate bowel prep showing only sigmoid diverticulosis.  Random colon biopsies negative for microscopic colitis.  Symptoms of pain resolved.    Endoscopy History:  EGD -none previously  Colonoscopy -May 24, 2024 with adequate bowel prep showing only sigmoid diverticulosis.  Random colon biopsies negative for microscopic colitis    REVIEW OF SYSTEMS:    CONSTITUTIONAL: Denies any fever, chills, rigors, and weight loss.  HEENT: Denies hearing loss or visual disturbances.  CARDIOVASCULAR: No chest pain or palpitations.   RESPIRATORY: Denies any cough, hemoptysis, shortness of breath or dyspnea on exertion.  GASTROINTESTINAL: As noted in the History of Present Illness.   GENITOURINARY: No problems with urination. Denies any hematuria or dysuria.  NEUROLOGIC: No dizziness or vertigo, denies headaches.   MUSCULOSKELETAL: Denies any muscle or joint pain.   SKIN: Denies skin rashes or itching.   ENDOCRINE: Denies excessive thirst. Denies intolerance to heat or cold.  PSYCHOSOCIAL: Denies depression or anxiety. Denies any recent memory loss.       Historical Information  Past Medical History:   Diagnosis Date   • Anal fissure     Last assessed 12/19/2013   • Gall bladder disease    • Skin lesion     Last assessed 5/20/2016     Past Surgical History:   Procedure Laterality Date   • CHOLECYSTECTOMY     • WISDOM TOOTH EXTRACTION       Social History  Social History     Substance and Sexual Activity   Alcohol Use Yes    Comment: Once a month     Social History     Substance and Sexual Activity   Drug Use No     Social History     Tobacco Use   Smoking Status Never   • Passive exposure: Never   Smokeless Tobacco Never     Family History   Problem Relation Age of Onset   • Other Mother    • No Known Problems Father   "  • Cholecystitis Sister    • Colon cancer Maternal Grandmother    • Melanoma Paternal Grandfather         later in life   • Colon cancer Maternal Uncle    • Ovarian cancer Maternal Great-Grandmother    • Breast cancer Maternal Great-Grandmother         later in life   • Mental illness Neg Hx        Meds/Allergies      Current Outpatient Medications:   •  norgestimate-ethinyl estradiol (Tri-Estarylla) 0.18/0.215/0.25 MG-35 MCG per tablet    No Known Allergies        Objective    Blood pressure 98/72, pulse 74, height 5' 6\" (1.676 m), weight 81.6 kg (179 lb 12.8 oz), not currently breastfeeding. Body mass index is 29.02 kg/m².        PHYSICAL EXAM:      General Appearance:   Alert, cooperative, no distress, appears stated age   HEENT:   Normocephalic, atraumatic, anicteric.     Neck:  Supple, symmetrical   Lungs:   Clear to auscultation bilaterally; no rales, rhonchi or wheezing; respirations unlabored    Heart::   Regular rate and rhythm; no murmur, rub, or gallop.   Abdomen:   LLQ TTP mild, Soft,  non-distended; normal bowel sounds; no masses, no organomegaly    Genitalia:   Deferred    Rectal:   Deferred    Extremities:  No cyanosis, clubbing or edema    Pulses:  Not assessed   Skin:  No jaundice, rashes, or lesions    Lymph nodes:  Not assessed       Lab Results:   No visits with results within 1 Day(s) from this visit.   Latest known visit with results is:   Hospital Outpatient Visit on 05/24/2024   Component Date Value   • EXT Preg Test, Ur 05/24/2024 Negative    • Control 05/24/2024 Valid    • Case Report 05/24/2024                      Value:Surgical Pathology Report                         Case: [id number]                                  Authorizing Provider:  Laura Mitchell MD       Collected:           05/24/2024 1346              [facility]   Received:            05/24/2024 [address] [place] [address]                                              " "                         Pathologist:           Azar Phillip DO                                                            Specimen:    Colon, Random colon  bx r/o microscopic colitis                                           • Final Diagnosis 05/24/2024                      Value:A. Colon, random, biopsy:  -Colonic mucosa with no significant histopathologic change.      • Additional Information 05/24/2024                      Value:All reported additional testing was performed with appropriately reactive controls.  These tests were developed and their performance characteristics determined by [facility] or appropriate performing facility, though some tests may be performed on tissues which have not been validated for performance characteristics (such as staining performed on alcohol exposed cell blocks and decalcified tissues).  Results should be interpreted with caution and in the context of the patients’ clinical condition. These tests may not be cleared or approved by the U.S. Food and Drug Administration, though the FDA has determined that such clearance or approval is not necessary. These tests are used for clinical purposes and they should not be regarded as investigational or for research. This laboratory has been approved by CLIA 88, designated as a high-complexity laboratory and is qualified to perform these tests.    Interpretation performed at [facility], [address] [place] [address]     • Gross Description 05/24/2024                      Value:A. The specimen is received in formalin, labeled with the patient's name and hospital number, and is designated \" random colon biopsy rule out microscopic colitis\".  The specimen consists of 3 tan soft tissue fragments measuring 0.2, 0.2 and 0.3 cm.  Entirely submitted. Screened cassette.    Note: The estimated total formalin fixation time based upon information provided by the submitting clinician and the " standard processing schedule is under 72 hours.    [facility]           Radiology Results:   No results found.    Placido Xiao PA-C